# Patient Record
Sex: FEMALE | Race: WHITE | Employment: OTHER | URBAN - METROPOLITAN AREA
[De-identification: names, ages, dates, MRNs, and addresses within clinical notes are randomized per-mention and may not be internally consistent; named-entity substitution may affect disease eponyms.]

---

## 2021-04-02 DIAGNOSIS — M25.562 LEFT KNEE PAIN, UNSPECIFIED CHRONICITY: ICD-10-CM

## 2021-04-02 DIAGNOSIS — M25.561 RIGHT KNEE PAIN, UNSPECIFIED CHRONICITY: Primary | ICD-10-CM

## 2021-04-06 ENCOUNTER — OFFICE VISIT (OUTPATIENT)
Dept: ORTHOPEDIC SURGERY | Age: 66
End: 2021-04-06

## 2021-04-06 VITALS — WEIGHT: 200 LBS | BODY MASS INDEX: 36.8 KG/M2 | HEIGHT: 62 IN | TEMPERATURE: 98 F

## 2021-04-06 DIAGNOSIS — M17.11 PRIMARY OSTEOARTHRITIS OF RIGHT KNEE: Primary | ICD-10-CM

## 2021-04-06 DIAGNOSIS — M17.12 PRIMARY OSTEOARTHRITIS OF LEFT KNEE: ICD-10-CM

## 2021-04-06 PROCEDURE — 20610 DRAIN/INJ JOINT/BURSA W/O US: CPT | Performed by: ORTHOPAEDIC SURGERY

## 2021-04-06 PROCEDURE — 99204 OFFICE O/P NEW MOD 45 MIN: CPT | Performed by: ORTHOPAEDIC SURGERY

## 2021-04-06 RX ORDER — LISINOPRIL 40 MG/1
TABLET ORAL
COMMUNITY
Start: 2021-01-14

## 2021-04-06 RX ORDER — ATORVASTATIN CALCIUM 40 MG/1
TABLET, FILM COATED ORAL
COMMUNITY
Start: 2021-01-20

## 2021-04-06 RX ORDER — TRIAMCINOLONE ACETONIDE 40 MG/ML
40 INJECTION, SUSPENSION INTRA-ARTICULAR; INTRAMUSCULAR ONCE
Status: COMPLETED | OUTPATIENT
Start: 2021-04-06 | End: 2021-04-06

## 2021-04-06 RX ORDER — PIOGLITAZONEHYDROCHLORIDE 45 MG/1
TABLET ORAL
COMMUNITY
Start: 2021-01-21

## 2021-04-06 RX ORDER — DULAGLUTIDE 1.5 MG/.5ML
INJECTION, SOLUTION SUBCUTANEOUS
COMMUNITY
Start: 2021-03-15 | End: 2022-04-13

## 2021-04-06 RX ORDER — DAPAGLIFLOZIN 10 MG/1
TABLET, FILM COATED ORAL
COMMUNITY
Start: 2021-02-02

## 2021-04-06 RX ADMIN — TRIAMCINOLONE ACETONIDE 40 MG: 40 INJECTION, SUSPENSION INTRA-ARTICULAR; INTRAMUSCULAR at 14:36

## 2021-04-06 RX ADMIN — TRIAMCINOLONE ACETONIDE 40 MG: 40 INJECTION, SUSPENSION INTRA-ARTICULAR; INTRAMUSCULAR at 14:37

## 2021-04-06 NOTE — PROGRESS NOTES
Chief Complaint   Patient presents with    Knee Pain     Bilateral Knee, x years, no known injury, no previous knee surgery. Subjective:     Patient ID: Aby Bentley is a 77 y.o..  female    Knee Pain  Patient complains of bilateral knee pain L>R. This is evaluated as a personal injury. There was not a history of injury. The pain began 6 months ago. The pain is located medial. She describes  Her symptoms as aching, sharp, shooting and stabbing. She has experienced popping, clicking, locking, and giving way in the affected knee. The patient has had pain with kneeling, squating, and climbing stairs. Symptoms improve with rest, heat, ice, cortisone injection and tylenol. The symptoms are worse with activity. The knee has given out or felt unstable. The patient cannot bend and straighten the knee fully. The patient is active in none. Treatment to date has been ice, heat, Tylenol, cortisone injection, without significant relief. The patient is working. The patients occupation is retired. Past Medical History:   Diagnosis Date    Hernia     Reflux      Past Surgical History:   Procedure Laterality Date     SECTION         Current Outpatient Medications:     atorvastatin (LIPITOR) 40 MG tablet, take 1 tablet by mouth once daily, Disp: , Rfl:     FARXIGA 10 MG tablet, take 1 tablet by mouth once daily, Disp: , Rfl:     TRULICITY 1.5 NC/5.3ZB SOPN, inject 0.5 milliliters ( 1 AND 1/2 milligrams ) subcutaneously ev. ..  (REFER TO PRESCRIPTION NOTES). , Disp: , Rfl:     lisinopril (PRINIVIL;ZESTRIL) 40 MG tablet, take 1 tablet by mouth once daily, Disp: , Rfl:     pioglitazone (ACTOS) 45 MG tablet, take 1 tablet by mouth once daily, Disp: , Rfl:     metoprolol (TOPROL-XL) 25 MG XL tablet, Take 25 mg by mouth daily. , Disp: , Rfl:     esomeprazole (NEXIUM) 20 MG capsule, Take 20 mg by mouth every morning (before breakfast). , Disp: , Rfl:   No Known Allergies  Social History Socioeconomic History    Marital status:      Spouse name: Not on file    Number of children: Not on file    Years of education: Not on file    Highest education level: Not on file   Occupational History    Not on file   Social Needs    Financial resource strain: Not on file    Food insecurity     Worry: Not on file     Inability: Not on file    Transportation needs     Medical: Not on file     Non-medical: Not on file   Tobacco Use    Smoking status: Never Smoker    Smokeless tobacco: Never Used   Substance and Sexual Activity    Alcohol use: No    Drug use: No    Sexual activity: Not on file   Lifestyle    Physical activity     Days per week: Not on file     Minutes per session: Not on file    Stress: Not on file   Relationships    Social connections     Talks on phone: Not on file     Gets together: Not on file     Attends Tenriism service: Not on file     Active member of club or organization: Not on file     Attends meetings of clubs or organizations: Not on file     Relationship status: Not on file    Intimate partner violence     Fear of current or ex partner: Not on file     Emotionally abused: Not on file     Physically abused: Not on file     Forced sexual activity: Not on file   Other Topics Concern    Not on file   Social History Narrative    Not on file     No family history on file. REVIEW OF SYSTEMS:     General/Constitution:  (-)weight loss, (-)fever, (-)chills, (-)weakness. Skin: (-) rash,(-) psoriasis,(-) eczema, (-)skin cancer. Musculoskeletal: (-) fractures,  (-) dislocations,(-) collagen vascular disease, (-) fibromyalgia, (-) multiple sclerosis, (-) muscular dystrophy, (-) RSD,(-) joint pain (-)swelling, (-) joint pain,swelling. Neurologic: (-) epilepsy, (-)seizures,(-) brain tumor,(-) TIA, (-)stroke, (-)headaches, (-)Parkinson disease,(-) memory loss, (-) LOC.   Cardiovascular: (-) Chest pain, (-) swelling in legs/feet, (-) SOB, (-) cramping in legs/feet range of motion, no tenderness, palpable spasm or pain on motion. Special tests: Straight Leg Raise negative, Preston test negative. Hip exam:   Upon inspection, there is not deformity noted. Upon palpation there is not tenderness. ROM: is  full and symmetrical.   Strength: Hip Flexors 5/5; Hip Abductors 5/5; Hip Adduction 5/5. Knee exam:  Bilateral knee exam shows;  range of motion of R. Knee is 0 to 110, and L. Knee is 0 to 110. The patient does have  pain on motion, effusion is none, there is tenderness over the  medial region, there are not any masses, there is not ligamentous instability, there is varus  deformity noted. Knee exam: bilateral positive for moderate crepitations, some mild tenderness laxity is not noted with  stress. There is not a popliteal cyst.    R. Knee:  Lachman's negative, Anterior Drawer negative, Posterior Drawer negative  Marjorie's positive, Thallasy  positive,   PF grind test positive, Apprehension test negative, Patellar J sign  negative  L. Knee:  Lachman's negative, Anterior Drawer negative, Posterior Drawer negative  Marjorie's positive, Thallasy  positive,   PF grind test positive, Apprehension test negative,  Patellar J sign  negative    Xray Exam:  Moderate medial joint narrowing  Radiographic findings reviewed with patient    Assessment:  Encounter Diagnoses   Name Primary?  Primary osteoarthritis of right knee Yes    Primary osteoarthritis of left knee        Plan:  Natural history and expected course discussed. Questions answered. Educational materials distributed. Rest, ice, compression, and elevation (RICE) therapy. Reduction in offending activity. Patellar compression sleeve. I had a lengthy discussion with the patient regarding their diagnosis. I explained treatment options including surgical vs non surgical treatment. I reviewed in detail the risks and benefits and outlined the procedure in detail with expected outcomes and possible complications.

## 2021-04-08 ENCOUNTER — TELEPHONE (OUTPATIENT)
Dept: ORTHOPEDIC SURGERY | Age: 66
End: 2021-04-08

## 2021-05-05 ENCOUNTER — NURSE ONLY (OUTPATIENT)
Dept: ORTHOPEDIC SURGERY | Age: 66
End: 2021-05-05

## 2021-05-05 DIAGNOSIS — M17.11 PRIMARY OSTEOARTHRITIS OF RIGHT KNEE: Primary | ICD-10-CM

## 2021-05-05 DIAGNOSIS — M17.12 PRIMARY OSTEOARTHRITIS OF LEFT KNEE: ICD-10-CM

## 2021-05-05 PROCEDURE — 20610 DRAIN/INJ JOINT/BURSA W/O US: CPT | Performed by: NURSE PRACTITIONER

## 2021-05-05 RX ORDER — HYALURONATE SODIUM 10 MG/ML
20 SYRINGE (ML) INTRAARTICULAR ONCE
Status: COMPLETED | OUTPATIENT
Start: 2021-05-05 | End: 2021-05-05

## 2021-05-05 RX ADMIN — Medication 20 MG: at 09:17

## 2021-05-05 NOTE — PROGRESS NOTES
Chief Complaint   Patient presents with    Injections     b/l euflexxa #1       Verbal and written consent was obtained by the patient. The following is a well known to me that is here for bilateral knee injections. They are here for  Euflexxa # 1. Her knees were prepped in sterile fashion. Euflexxa 20 mg injected to Bilateral knees. The patient tolerated the injections well and I will see the patient back in 1 week for repeat injections. Ba Zimmer was seen today for injections.     Diagnoses and all orders for this visit:    Primary osteoarthritis of right knee  -     20610 - [place] DRAIN/INJECT LARGE JOINT/BURSA    Primary osteoarthritis of left knee  -     20610 - [place] DRAIN/INJECT LARGE JOINT/BURSA    Other orders  -     sodium hyaluronate (EUFLEXXA, HYALGAN) injection 20 mg  -     sodium hyaluronate (EUFLEXXA, HYALGAN) injection 20 mg

## 2021-05-12 ENCOUNTER — NURSE ONLY (OUTPATIENT)
Dept: ORTHOPEDIC SURGERY | Age: 66
End: 2021-05-12

## 2021-05-12 DIAGNOSIS — M17.11 PRIMARY OSTEOARTHRITIS OF RIGHT KNEE: Primary | ICD-10-CM

## 2021-05-12 DIAGNOSIS — M17.12 PRIMARY OSTEOARTHRITIS OF LEFT KNEE: ICD-10-CM

## 2021-05-12 PROCEDURE — 20610 DRAIN/INJ JOINT/BURSA W/O US: CPT | Performed by: NURSE PRACTITIONER

## 2021-05-12 RX ORDER — HYALURONATE SODIUM 10 MG/ML
20 SYRINGE (ML) INTRAARTICULAR ONCE
Status: COMPLETED | OUTPATIENT
Start: 2021-05-12 | End: 2021-05-12

## 2021-05-12 RX ADMIN — Medication 20 MG: at 08:58

## 2021-05-12 NOTE — PROGRESS NOTES
Chief Complaint   Patient presents with    Injections     b/l euflexxa #2       Verbal and written consent was obtained by the patient. The following is a well known to me that is here for bilateral knee injections. They are here for  Euflexxa # 2. Her knees were prepped in sterile fashion. Euflexxa 20 mg injected to Bilateral knees. The patient tolerated the injections well and I will see the patient back in 1 week for repeat injections. Roxy Kulkarni was seen today for injections.     Diagnoses and all orders for this visit:    Primary osteoarthritis of right knee  -     20610 - [place] DRAIN/INJECT LARGE JOINT/BURSA    Primary osteoarthritis of left knee  -     20610 - [place] DRAIN/INJECT LARGE JOINT/BURSA    Other orders  -     sodium hyaluronate (EUFLEXXA, HYALGAN) injection 20 mg  -     sodium hyaluronate (EUFLEXXA, HYALGAN) injection 20 mg

## 2021-05-19 ENCOUNTER — NURSE ONLY (OUTPATIENT)
Dept: ORTHOPEDIC SURGERY | Age: 66
End: 2021-05-19

## 2021-05-19 DIAGNOSIS — M17.12 PRIMARY OSTEOARTHRITIS OF LEFT KNEE: ICD-10-CM

## 2021-05-19 DIAGNOSIS — M17.11 PRIMARY OSTEOARTHRITIS OF RIGHT KNEE: Primary | ICD-10-CM

## 2021-05-19 PROCEDURE — 20610 DRAIN/INJ JOINT/BURSA W/O US: CPT | Performed by: NURSE PRACTITIONER

## 2021-05-19 RX ORDER — HYALURONATE SODIUM 10 MG/ML
20 SYRINGE (ML) INTRAARTICULAR ONCE
Status: COMPLETED | OUTPATIENT
Start: 2021-05-19 | End: 2021-05-19

## 2021-05-19 RX ADMIN — Medication 20 MG: at 09:04

## 2021-05-19 NOTE — PROGRESS NOTES
Chief Complaint   Patient presents with    Injections     b/l euflexxa #3       Verbal and written consent was obtained by the patient. The following is a well known to me that is here for Euflexxa #3. The knee was prepped in sterile fashion. Euflexxa 20 mg injected to Bilateral  knee . The patient tolerated the injections well and I will see the patient back as needed. Tay Pabon was seen today for injections.     Diagnoses and all orders for this visit:    Primary osteoarthritis of right knee  -     20610 - [place] DRAIN/INJECT LARGE JOINT/BURSA    Primary osteoarthritis of left knee  -     58629 - [place] DRAIN/INJECT LARGE JOINT/BURSA    Other orders  -     sodium hyaluronate (EUFLEXXA, HYALGAN) injection 20 mg  -     sodium hyaluronate (EUFLEXXA, HYALGAN) injection 20 mg

## 2021-06-16 ENCOUNTER — OFFICE VISIT (OUTPATIENT)
Dept: ORTHOPEDIC SURGERY | Age: 66
End: 2021-06-16

## 2021-06-16 VITALS — HEIGHT: 62 IN | WEIGHT: 200 LBS | BODY MASS INDEX: 36.8 KG/M2 | TEMPERATURE: 98 F

## 2021-06-16 DIAGNOSIS — M17.11 PRIMARY OSTEOARTHRITIS OF RIGHT KNEE: Primary | ICD-10-CM

## 2021-06-16 DIAGNOSIS — M17.12 PRIMARY OSTEOARTHRITIS OF LEFT KNEE: ICD-10-CM

## 2021-06-16 PROCEDURE — 99213 OFFICE O/P EST LOW 20 MIN: CPT | Performed by: NURSE PRACTITIONER

## 2021-06-16 RX ORDER — CELECOXIB 200 MG/1
200 CAPSULE ORAL DAILY
Qty: 30 CAPSULE | Refills: 3 | Status: SHIPPED
Start: 2021-06-16 | End: 2021-08-06

## 2021-06-16 RX ORDER — POLYETHYLENE GLYCOL 3350 17 G/17G
POWDER, FOR SOLUTION ORAL
Status: ON HOLD | COMMUNITY
Start: 2021-05-07 | End: 2021-09-24

## 2021-06-16 NOTE — PROGRESS NOTES
Chief Complaint   Patient presents with    Knee Pain     B/L knee pain follow up after Euflexxa. Knee pain reamains the same. No difference after injections. Lorraine Su returns today for follow-up of her bilateral knee pain. she reports this is worse than when I saw her last.  The patient's pain level is a 7/10. The previous treatment was not successful. Past Medical History:   Diagnosis Date    Hernia of unspecified site of abdominal cavity without mention of obstruction or gangrene     Reflux      Past Surgical History:   Procedure Laterality Date     SECTION         Current Outpatient Medications:     celecoxib (CELEBREX) 200 MG capsule, Take 1 capsule by mouth daily, Disp: 30 capsule, Rfl: 3    polyethylene glycol (GLYCOLAX) 17 GM/SCOOP powder, take as directed for COLONOSCOPY PREP, Disp: , Rfl:     atorvastatin (LIPITOR) 40 MG tablet, take 1 tablet by mouth once daily, Disp: , Rfl:     FARXIGA 10 MG tablet, take 1 tablet by mouth once daily, Disp: , Rfl:     TRULICITY 1.5 JW/4.9YR SOPN, inject 0.5 milliliters ( 1 AND 1/2 milligrams ) subcutaneously ev. ..  (REFER TO PRESCRIPTION NOTES). , Disp: , Rfl:     lisinopril (PRINIVIL;ZESTRIL) 40 MG tablet, take 1 tablet by mouth once daily, Disp: , Rfl:     pioglitazone (ACTOS) 45 MG tablet, take 1 tablet by mouth once daily, Disp: , Rfl:     metoprolol (TOPROL-XL) 25 MG XL tablet, Take 25 mg by mouth daily. , Disp: , Rfl:     esomeprazole (NEXIUM) 20 MG capsule, Take 20 mg by mouth every morning (before breakfast). , Disp: , Rfl:   No Known Allergies  Social History     Socioeconomic History    Marital status:       Spouse name: Not on file    Number of children: Not on file    Years of education: Not on file    Highest education level: Not on file   Occupational History    Not on file   Tobacco Use    Smoking status: Never Smoker    Smokeless tobacco: Never Used   Substance and Sexual Activity    Alcohol use: No    Drug use: No    Sexual activity: Not on file   Other Topics Concern    Not on file   Social History Narrative    Not on file     Social Determinants of Health     Financial Resource Strain:     Difficulty of Paying Living Expenses:    Food Insecurity:     Worried About Running Out of Food in the Last Year:     [address] in the Last Year:    Transportation Needs:     Lack of Transportation (Medical):  Lack of Transportation (Non-Medical):    Physical Activity:     Days of Exercise per Week:     Minutes of Exercise per Session:    Stress:     Feeling of Stress :    Social Connections:     Frequency of Communication with Friends and Family:     Frequency of Social Gatherings with Friends and Family:     Attends [employer] Services:     Active Member of Clubs or Organizations:     Attends Club or Organization Meetings:     Marital Status:    Intimate Partner Violence:     Fear of Current or Ex-Partner:     Emotionally Abused:     Physically Abused:     Sexually Abused:      History reviewed. No pertinent family history. Review of Systems:     Skin: (-) rash,(-) psoriasis,(-) eczema, (-)skin cancer. Musculoskeletal: (-) fractures,  (-) dislocations,(-) collagen vascular disease, (-) fibromyalgia, (-) multiple sclerosis, (-) muscular dystrophy, (-) RSD,(-) joint pain (-)swelling, (-) joint pain,swelling. Neurologic: (-) epilepsy, (-)seizures,(-) brain tumor,(-) TIA, (-)stroke, (-)headaches, (-)Parkinson disease,(-) memory loss, (-) LOC. Cardiovascular: (-) Chest pain, (-) swelling in legs/feet, (-) SOB, (-) cramping in legs/feet with walking. Constitutional:  The patient is alert and oriented x 3, appears to be stated age and in no distress. Temp 98 °F (36.7 °C)   Ht 5' 2\" (1.575 m)   Wt 200 lb (90.7 kg)   BMI 36.58 kg/m²     Skin:  Upon inspection: the skin appears warm, dry and intact. There is not a previous scar over the affected area. There is not any cellulitis, lymphedema or cutaneous lesions noted in the lower extremities. Upon palpation there is no induration noted. Neurologic:  Gait: normal;  Motor exam of the lower extremities show ; quadriceps, hamstrings, foot dorsi and plantar flexors intact R.  5/5 and L. 5/5. Deep tendon reflexes are 2/4 at the knees and 2/4 at the ankles with strong extensor hallicus longus motor strength bilaterally. Sensory to both feet is intact to all sensory roots. Cardiovascular: The vascular exam is normal and is well perfused to distal extremities. Distal pulses DP/PT: R. 2+; L. 2+. There is cap refill noted less than two seconds in all digits. There is not edema of the bilateral lower extremities. There is not varicosities noted in the distal extremities. Lymph:  Upon palpation,  there is no lymphadenopathy noted in bilateral lower extremities. Musculoskeletal:  Gait: antalgic; examination of the nails and digits reveal no cyanosis or clubbing    Lumbar exam:  On visual inspection, there is no deformity of the spine. full range of motion, no tenderness, palpable spasm or pain on motion. Special tests: Straight Leg Raise negative, Preston testnegative. Hip exam:  Upon inspection, there is no deformity noted. Upon palpation there is not tenderness. ROM: is   full and semetrical.   Strength: Hip Flexors 5/5; Hip Abductors 5/5; Hip Adduction 5/5. Knee exam:  Bilateral knee exam shows;  range of motion of R. Knee is 0 to 120, and L. Knee is 0 to 120. She does have  pain on motion, effusion is mild, there is tenderness over the  medial region, there are not any masses, there is not ligamentous instability, there is  deformity noted. Knee exam: bilateral positive for moderate crepitations, some mild tenderness laxity is not noted with  stress.       R. Knee:  Lachman's negative, Anterior Drawer negative, Posterior Drawer negative  Marjorie's positive, Thallasy  positive,   PF grind test positive, Apprehension test negative, Patellar J sign  negative  L. Knee:  Lachman's negative, Anterior Drawer negative, Posterior Drawer negative  Marjorie's positive, Thallasy  positive,   PF grind test positive, Apprehension test negative,  Patellar J sign  negative    Xrays: There is moderate compartmental narrowing medially in both knees.  There are   small medially and laterally oriented spurs bilaterally.  These are mildly   more prominent on the right.  There is no joint effusion.  There is mild   degenerative changes in the right patellofemoral articulation, superiorly. MRI:   n/a  Radiographic findings reviewed with patient    Impression:  Encounter Diagnoses   Name Primary?  Primary osteoarthritis of right knee Yes    Primary osteoarthritis of left knee        Plan:   Natural history and expected course discussed. Questions answered. Educational materials distributed. Rest, ice, compression, and elevation (RICE) therapy. Reduction in offending activity. I had a lengthy discussion with the patient regarding their diagnosis. I explained treatment options including surgical vs non surgical treatment. I reviewed in detail the risks and benefits and outlined the procedure in detail with expected outcomes and possible complications. I also discussed non surgical treatment such as injections (CSI and visco supplementation), physical therapy, topical creams and NSAID's. They have elected for conservative management at this time. The patient has failed conservative measures such as NSAIDS, HEP, and cortisone injections. She is an excellent candidate for Zilretta injections  in the Bilateral knee.  We will contact the patient's insurance company and see them back in the office once we have received approval.  celebrex

## 2021-07-02 ENCOUNTER — NURSE ONLY (OUTPATIENT)
Dept: ORTHOPEDIC SURGERY | Age: 66
End: 2021-07-02
Payer: MEDICARE

## 2021-07-02 DIAGNOSIS — M17.12 PRIMARY OSTEOARTHRITIS OF LEFT KNEE: ICD-10-CM

## 2021-07-02 DIAGNOSIS — M17.11 PRIMARY OSTEOARTHRITIS OF RIGHT KNEE: Primary | ICD-10-CM

## 2021-07-02 PROCEDURE — 20610 DRAIN/INJ JOINT/BURSA W/O US: CPT | Performed by: NURSE PRACTITIONER

## 2021-07-02 NOTE — PROGRESS NOTES
Chief Complaint   Patient presents with    Injections     b/l zilretta        I will proceed with a cortisone injection in the Bilateral knee. Verbal and written consent was obtained for the injections. The skin was prepped with alcohol. A prepared mixture of 32 mg of Zilretta and 5mL diluent was injected to Bilateral knee. The injection was given through the lateral side of the knee. The patient tolerated the injection well. I will see the patient back prn. Ishaan Naik was seen today for injections.     Diagnoses and all orders for this visit:    Primary osteoarthritis of right knee  -     20610 - [place] DRAIN/INJECT LARGE JOINT/BURSA    Primary osteoarthritis of left knee  -     20610 - [place] DRAIN/INJECT LARGE JOINT/BURSA    Other orders  -     triamcinolone acetonide (ZILRETTA) intra-articular injection 32 mg  -     triamcinolone acetonide (ZILRETTA) intra-articular injection 32 mg

## 2021-08-06 ENCOUNTER — OFFICE VISIT (OUTPATIENT)
Dept: ORTHOPEDIC SURGERY | Age: 66
End: 2021-08-06
Payer: MEDICARE

## 2021-08-06 VITALS — TEMPERATURE: 98 F | HEIGHT: 62 IN | WEIGHT: 200 LBS | BODY MASS INDEX: 36.8 KG/M2

## 2021-08-06 DIAGNOSIS — S83.242A OTHER TEAR OF MEDIAL MENISCUS OF LEFT KNEE AS CURRENT INJURY, INITIAL ENCOUNTER: ICD-10-CM

## 2021-08-06 DIAGNOSIS — S83.241A ACUTE MEDIAL MENISCUS TEAR, RIGHT, INITIAL ENCOUNTER: ICD-10-CM

## 2021-08-06 DIAGNOSIS — M17.12 PRIMARY OSTEOARTHRITIS OF LEFT KNEE: Primary | ICD-10-CM

## 2021-08-06 DIAGNOSIS — M17.11 PRIMARY OSTEOARTHRITIS OF RIGHT KNEE: ICD-10-CM

## 2021-08-06 PROCEDURE — 99213 OFFICE O/P EST LOW 20 MIN: CPT | Performed by: NURSE PRACTITIONER

## 2021-08-06 NOTE — PROGRESS NOTES
Chief Complaint   Patient presents with    Knee Pain     B/L ZILRETTA F/U says it did not help       Gama Meng returns today for follow-up of her bilateral knee pain. she reports this is worse than when I saw her last. L>R The patient's pain level is a 7/10. The previous treatment was not successful. She has failed zilretta, csi and visco.    Past Medical History:   Diagnosis Date    Hernia of unspecified site of abdominal cavity without mention of obstruction or gangrene     Reflux      Past Surgical History:   Procedure Laterality Date     SECTION         Current Outpatient Medications:     polyethylene glycol (GLYCOLAX) 17 GM/SCOOP powder, take as directed for COLONOSCOPY PREP, Disp: , Rfl:     atorvastatin (LIPITOR) 40 MG tablet, take 1 tablet by mouth once daily, Disp: , Rfl:     FARXIGA 10 MG tablet, take 1 tablet by mouth once daily, Disp: , Rfl:     TRULICITY 1.5 XB/2.9UI SOPN, inject 0.5 milliliters ( 1 AND 1/2 milligrams ) subcutaneously ev. ..  (REFER TO PRESCRIPTION NOTES). , Disp: , Rfl:     lisinopril (PRINIVIL;ZESTRIL) 40 MG tablet, take 1 tablet by mouth once daily, Disp: , Rfl:     pioglitazone (ACTOS) 45 MG tablet, take 1 tablet by mouth once daily, Disp: , Rfl:     metoprolol (TOPROL-XL) 25 MG XL tablet, Take 25 mg by mouth daily. , Disp: , Rfl:     esomeprazole (NEXIUM) 20 MG capsule, Take 20 mg by mouth every morning (before breakfast). , Disp: , Rfl:   No Known Allergies  Social History     Socioeconomic History    Marital status:       Spouse name: Not on file    Number of children: Not on file    Years of education: Not on file    Highest education level: Not on file   Occupational History    Not on file   Tobacco Use    Smoking status: Never Smoker    Smokeless tobacco: Never Used   Substance and Sexual Activity    Alcohol use: No    Drug use: No    Sexual activity: Not on file   Other Topics Concern    Not on file   Social History Narrative    Not on file     Social Determinants of Health     Financial Resource Strain:     Difficulty of Paying Living Expenses:    Food Insecurity:     Worried About Running Out of Food in the Last Year:     [address] in the Last Year:    Transportation Needs:     Lack of Transportation (Medical):  Lack of Transportation (Non-Medical):    Physical Activity:     Days of Exercise per Week:     Minutes of Exercise per Session:    Stress:     Feeling of Stress :    Social Connections:     Frequency of Communication with Friends and Family:     Frequency of Social Gatherings with Friends and Family:     Attends [employer] Services:     Active Member of Clubs or Organizations:     Attends Club or Organization Meetings:     Marital Status:    Intimate Partner Violence:     Fear of Current or Ex-Partner:     Emotionally Abused:     Physically Abused:     Sexually Abused:      No family history on file. Review of Systems:     Skin: (-) rash,(-) psoriasis,(-) eczema, (-)skin cancer. Musculoskeletal: (-) fractures,  (-) dislocations,(-) collagen vascular disease, (-) fibromyalgia, (-) multiple sclerosis, (-) muscular dystrophy, (-) RSD,(-) joint pain (-)swelling, (-) joint pain,swelling. Neurologic: (-) epilepsy, (-)seizures,(-) brain tumor,(-) TIA, (-)stroke, (-)headaches, (-)Parkinson disease,(-) memory loss, (-) LOC. Cardiovascular: (-) Chest pain, (-) swelling in legs/feet, (-) SOB, (-) cramping in legs/feet with walking. Constitutional:  The patient is alert and oriented x 3, appears to be stated age and in no distress. Temp 98 °F (36.7 °C)   Ht 5' 2\" (1.575 m)   Wt 200 lb (90.7 kg)   BMI 36.58 kg/m²     Skin:  Upon inspection: the skin appears warm, dry and intact. There is not a previous scar over the affected area. There is not any cellulitis, lymphedema or cutaneous lesions noted in the lower extremities. Upon palpation there is no induration noted.       Neurologic:  Gait: normal;  Motor exam of the lower extremities show ; quadriceps, hamstrings, foot dorsi and plantar flexors intact R.  5/5 and L. 5/5. Deep tendon reflexes are 2/4 at the knees and 2/4 at the ankles with strong extensor hallicus longus motor strength bilaterally. Sensory to both feet is intact to all sensory roots. Cardiovascular: The vascular exam is normal and is well perfused to distal extremities. Distal pulses DP/PT: R. 2+; L. 2+. There is cap refill noted less than two seconds in all digits. There is not edema of the bilateral lower extremities. There is not varicosities noted in the distal extremities. Lymph:  Upon palpation,  there is no lymphadenopathy noted in bilateral lower extremities. Musculoskeletal:  Gait: antalgic; examination of the nails and digits reveal no cyanosis or clubbing    Lumbar exam:  On visual inspection, there is no deformity of the spine. full range of motion, no tenderness, palpable spasm or pain on motion. Special tests: Straight Leg Raise negative, Preston testnegative. Hip exam:  Upon inspection, there is no deformity noted. Upon palpation there is not tenderness. ROM: is   full and semetrical.   Strength: Hip Flexors 5/5; Hip Abductors 5/5; Hip Adduction 5/5. Knee exam:  Bilateral knee exam shows;  range of motion of R. Knee is 0 to 120, and L. Knee is 0 to 120. She does have  pain on motion, effusion is mild, there is tenderness over the  medial region, there are not any masses, there is not ligamentous instability, there is not  deformity noted. Knee exam: bilateral positive for moderate crepitations, some mild tenderness laxity is not noted with  stress.       R. Knee:  Lachman's negative, Anterior Drawer negative, Posterior Drawer negative  Marjorie's positive, Thallasy  positive,   PF grind test positive, Apprehension test negative, Patellar J sign  negative  L. Knee:  Lachman's negative, Anterior Drawer negative, Posterior Drawer negative  Marjorie's positive, Thallasy  positive,   PF grind test positive, Apprehension test negative,  Patellar J sign  negative    Xrays: There is moderate compartmental narrowing medially in both knees.  There are   small medially and laterally oriented spurs bilaterally.  These are mildly   more prominent on the right.  There is no joint effusion.  There is mild   degenerative changes in the right patellofemoral articulation, superiorly.             MRI:   n/a  Radiographic findings reviewed with patient    Impression:  Encounter Diagnoses   Name Primary?  Primary osteoarthritis of left knee Yes    Other tear of medial meniscus of left knee as current injury, initial encounter        Plan:   Natural history and expected course discussed. Questions answered. Educational materials distributed. Rest, ice, compression, and elevation (RICE) therapy. Reduction in offending activity. I had a lengthy discussion with the patient regarding their diagnosis. I explained treatment options including surgical vs non surgical treatment. I reviewed in detail the risks and benefits and outlined the procedure in detail with expected outcomes and possible complications. I also discussed non surgical treatment such as injections (CSI and visco supplementation), physical therapy, topical creams and NSAID's. They have elected for conservative management at this time.    SORAYA left knee

## 2021-08-31 ENCOUNTER — OFFICE VISIT (OUTPATIENT)
Dept: ORTHOPEDIC SURGERY | Age: 66
End: 2021-08-31
Payer: MEDICARE

## 2021-08-31 VITALS — WEIGHT: 200 LBS | BODY MASS INDEX: 36.8 KG/M2 | TEMPERATURE: 98 F | HEIGHT: 62 IN

## 2021-08-31 DIAGNOSIS — S83.242A ACUTE MEDIAL MENISCUS TEAR, LEFT, INITIAL ENCOUNTER: ICD-10-CM

## 2021-08-31 DIAGNOSIS — M17.12 PRIMARY OSTEOARTHRITIS OF LEFT KNEE: Primary | ICD-10-CM

## 2021-08-31 PROCEDURE — 3017F COLORECTAL CA SCREEN DOC REV: CPT | Performed by: ORTHOPAEDIC SURGERY

## 2021-08-31 PROCEDURE — G8427 DOCREV CUR MEDS BY ELIG CLIN: HCPCS | Performed by: ORTHOPAEDIC SURGERY

## 2021-08-31 PROCEDURE — 1123F ACP DISCUSS/DSCN MKR DOCD: CPT | Performed by: ORTHOPAEDIC SURGERY

## 2021-08-31 PROCEDURE — G8400 PT W/DXA NO RESULTS DOC: HCPCS | Performed by: ORTHOPAEDIC SURGERY

## 2021-08-31 PROCEDURE — 1090F PRES/ABSN URINE INCON ASSESS: CPT | Performed by: ORTHOPAEDIC SURGERY

## 2021-08-31 PROCEDURE — 99214 OFFICE O/P EST MOD 30 MIN: CPT | Performed by: ORTHOPAEDIC SURGERY

## 2021-08-31 PROCEDURE — G8417 CALC BMI ABV UP PARAM F/U: HCPCS | Performed by: ORTHOPAEDIC SURGERY

## 2021-08-31 PROCEDURE — 4040F PNEUMOC VAC/ADMIN/RCVD: CPT | Performed by: ORTHOPAEDIC SURGERY

## 2021-08-31 PROCEDURE — 1036F TOBACCO NON-USER: CPT | Performed by: ORTHOPAEDIC SURGERY

## 2021-08-31 NOTE — PROGRESS NOTES
Chief Complaint   Patient presents with    Knee Pain     B/L knees MRI follow up       Munira Shoemaker returns today for follow-up of her bilateral knee pain. she reports this is worse than when I saw her last. L>R The patient's pain level is a 7/10. The previous treatment was not successful. She has failed zilretta, csi and visco.she is here today for mri results. Past Medical History:   Diagnosis Date    Hernia of unspecified site of abdominal cavity without mention of obstruction or gangrene     Reflux      Past Surgical History:   Procedure Laterality Date     SECTION         Current Outpatient Medications:     polyethylene glycol (GLYCOLAX) 17 GM/SCOOP powder, take as directed for COLONOSCOPY PREP, Disp: , Rfl:     atorvastatin (LIPITOR) 40 MG tablet, take 1 tablet by mouth once daily, Disp: , Rfl:     FARXIGA 10 MG tablet, take 1 tablet by mouth once daily, Disp: , Rfl:     TRULICITY 1.5 WH/4.6EF SOPN, inject 0.5 milliliters ( 1 AND 1/2 milligrams ) subcutaneously ev. ..  (REFER TO PRESCRIPTION NOTES). , Disp: , Rfl:     lisinopril (PRINIVIL;ZESTRIL) 40 MG tablet, take 1 tablet by mouth once daily, Disp: , Rfl:     pioglitazone (ACTOS) 45 MG tablet, take 1 tablet by mouth once daily, Disp: , Rfl:     metoprolol (TOPROL-XL) 25 MG XL tablet, Take 25 mg by mouth daily. , Disp: , Rfl:     esomeprazole (NEXIUM) 20 MG capsule, Take 20 mg by mouth every morning (before breakfast). , Disp: , Rfl:   No Known Allergies  Social History     Socioeconomic History    Marital status:       Spouse name: Not on file    Number of children: Not on file    Years of education: Not on file    Highest education level: Not on file   Occupational History    Not on file   Tobacco Use    Smoking status: Never Smoker    Smokeless tobacco: Never Used   Substance and Sexual Activity    Alcohol use: No    Drug use: No    Sexual activity: Not on file   Other Topics Concern    Not on file   Social History Narrative    Not on file     Social Determinants of Health     Financial Resource Strain:     Difficulty of Paying Living Expenses:    Food Insecurity:     Worried About Running Out of Food in the Last Year:     [address] in the Last Year:    Transportation Needs:     Lack of Transportation (Medical):  Lack of Transportation (Non-Medical):    Physical Activity:     Days of Exercise per Week:     Minutes of Exercise per Session:    Stress:     Feeling of Stress :    Social Connections:     Frequency of Communication with Friends and Family:     Frequency of Social Gatherings with Friends and Family:     Attends Alevism Services:     Active Member of Clubs or Organizations:     Attends Club or Organization Meetings:     Marital Status:    Intimate Partner Violence:     Fear of Current or Ex-Partner:     Emotionally Abused:     Physically Abused:     Sexually Abused:      History reviewed. No pertinent family history. Review of Systems:     Skin: (-) rash,(-) psoriasis,(-) eczema, (-)skin cancer. Musculoskeletal: (-) fractures,  (-) dislocations,(-) collagen vascular disease, (-) fibromyalgia, (-) multiple sclerosis, (-) muscular dystrophy, (-) RSD,(-) joint pain (-)swelling, (-) joint pain,swelling. Neurologic: (-) epilepsy, (-)seizures,(-) brain tumor,(-) TIA, (-)stroke, (-)headaches, (-)Parkinson disease,(-) memory loss, (-) LOC. Cardiovascular: (-) Chest pain, (-) swelling in legs/feet, (-) SOB, (-) cramping in legs/feet with walking. Constitutional:  _Temp 98 °F (36.7 °C)   Ht 5' 2\" (1.575 m)   Wt 200 lb (90.7 kg)   BMI 36.58 kg/m²  Vital signs are stable. In general, patient is awake, alert and oriented X3, in no apparent distress. Examination of HENT reveals normocephalic, atraumatic. PERRLA/EOMI sclera are white. Conjunctivae are clear. TM's are intact. Pharynx is pink and moist.  Uvula and tongue are midline.   Heart: Positive S1 and positive S2 with motion of R. Knee is 0 to 120, and L. Knee is 0 to 120. She does have  pain on motion, effusion is mild, there is tenderness over the  medial region, there are not any masses, there is not ligamentous instability, there is not  deformity noted. Knee exam: bilateral positive for moderate crepitations, some mild tenderness laxity is not noted with  stress. R. Knee:  Lachman's negative, Anterior Drawer negative, Posterior Drawer negative  Marjorie's positive, Thallasy  positive,   PF grind test positive, Apprehension test negative, Patellar J sign  negative  L. Knee:  Lachman's negative, Anterior Drawer negative, Posterior Drawer negative  Marjorie's positive, Thallasy  positive,   PF grind test positive, Apprehension test negative,  Patellar J sign  negative    Xrays: There is moderate compartmental narrowing medially in both knees.  There are   small medially and laterally oriented spurs bilaterally.  These are mildly   more prominent on the right.  There is no joint effusion.  There is mild   degenerative changes in the right patellofemoral articulation, superiorly.             MRI:   Left knee:  Medial meniscus tears and degeneration.       Tricompartmental degenerative changes to the knee most significant to the   medial compartment.       Small knee joint effusion.       Small Baker's cyst with loose body within the cyst measuring 11 mm.       Mild prominence and edema to the quadriceps fat pad, nonspecific but can be   seen with impingement. Right knee:  Tearing and degeneration of the medial meniscus.       Tricompartmental degenerative changes most significant to the medial   compartment.       Small Baker's cyst.       Mild prominence and edema to quadriceps fat pad, nonspecific but can be seen   with impingement. Radiographic findings reviewed with patient    Impression:  Encounter Diagnoses   Name Primary?     Primary osteoarthritis of left knee Yes    Acute medial meniscus tear, left,

## 2021-09-15 ENCOUNTER — ANESTHESIA EVENT (OUTPATIENT)
Dept: OPERATING ROOM | Age: 66
End: 2021-09-15
Payer: MEDICARE

## 2021-09-23 ASSESSMENT — LIFESTYLE VARIABLES: SMOKING_STATUS: 1

## 2021-09-23 NOTE — ANESTHESIA PRE PROCEDURE
Department of Anesthesiology  Preprocedure Note       Name:  Tian Cordova   Age:  77 y.o.  :  1955                                          MRN:  [id number]         Date:  2021      Surgeon: Lionel Easley): Carole Tamez DO    Procedure: Procedure(s):  LEFT KNEE ARTHROSCOPY, MEDIAL MENISECTOMY AND DEBRIDEMENT    Medications prior to admission:   Prior to Admission medications    Medication Sig Start Date End Date Taking? Authorizing Provider   atorvastatin (LIPITOR) 40 MG tablet take 1 tablet by mouth once daily 21  Yes Historical Provider, MD   FARXIGA 10 MG tablet take 1 tablet by mouth once daily 21  Yes Historical Provider, MD   TRULICITY 1.5 SG/3.4RZ SOPN renetta 3/15/21  Yes Historical Provider, MD   lisinopril (PRINIVIL;ZESTRIL) 40 MG tablet am 21  Yes Historical Provider, MD   pioglitazone (ACTOS) 45 MG tablet take 1 tablet by mouth once daily 21  Yes Historical Provider, MD   metoprolol (TOPROL-XL) 25 MG XL tablet Take 75 mg by mouth daily am   Yes Historical Provider, MD   polyethylene glycol (GLYCOLAX) 17 GM/SCOOP powder take as directed for COLONOSCOPY PREP 21   Historical Provider, MD       Current medications:    No current facility-administered medications for this encounter.      Current Outpatient Medications   Medication Sig Dispense Refill    atorvastatin (LIPITOR) 40 MG tablet take 1 tablet by mouth once daily      FARXIGA 10 MG tablet take 1 tablet by mouth once daily      TRULICITY 1.5 NO/0.6PL SOPN       lisinopril (PRINIVIL;ZESTRIL) 40 MG tablet am      pioglitazone (ACTOS) 45 MG tablet take 1 tablet by mouth once daily      metoprolol (TOPROL-XL) 25 MG XL tablet Take 75 mg by mouth daily am      polyethylene glycol (GLYCOLAX) 17 GM/SCOOP powder take as directed for COLONOSCOPY PREP         Allergies:  No Known Allergies    Problem List:    Patient Active Problem List   Diagnosis Code    Knee pain M25.569    Right knee DJD M17.11    Primary input(s): POCGLU, POCNA, POCK, POCCL, POCBUN, POCHEMO, POCHCT in the last 72 hours. Coags: No results found for: PROTIME, INR, APTT    HCG (If Applicable): No results found for: PREGTESTUR, PREGSERUM, HCG, HCGQUANT     ABGs: No results found for: PHART, PO2ART, TBL1OJX, BTS5XYX, BEART, P3RFLWDD     Type & Screen (If Applicable):  No results found for: LABABO, LABRH    Drug/Infectious Status (If Applicable):  No results found for: HIV, HEPCAB    COVID-19 Screening (If Applicable): No results found for: COVID19        Anesthesia Evaluation  Patient summary reviewed and Nursing notes reviewed no history of anesthetic complications:   Airway: Mallampati: III  TM distance: >3 FB   Neck ROM: full  Mouth opening: > = 3 FB Dental: normal exam         Pulmonary: breath sounds clear to auscultation  (+) current smoker (10 pk yrs)          Patient did not smoke on day of surgery. Cardiovascular:  Exercise tolerance: good (>4 METS),   (+) hypertension:, hyperlipidemia      ECG reviewed  Rhythm: regular             Beta Blocker:  Dose within 24 Hrs      ROS comment: EKG=SR   Normal EKG    Cleared by PCP    PE comment: tachycardia   Neuro/Psych:               GI/Hepatic/Renal:             Endo/Other:    (+) DiabetesType II DM, , : arthritis:., .                 Abdominal:   (+) obese,           Vascular: Other Findings:           Anesthesia Plan      general     ASA 2     (Possible SAB--prefers GA over SAB  PCP clearance on chart)  Induction: intravenous. BIS  MIPS: Postoperative opioids intended and Prophylactic antiemetics administered. Anesthetic plan and risks discussed with patient. Plan discussed with CRNA.                 Kai Garcia MD   9/23/2021

## 2021-09-24 ENCOUNTER — HOSPITAL ENCOUNTER (OUTPATIENT)
Age: 66
Setting detail: OUTPATIENT SURGERY
Discharge: HOME | End: 2021-09-24
Attending: ORTHOPAEDIC SURGERY | Admitting: ORTHOPAEDIC SURGERY
Payer: MEDICARE

## 2021-09-24 ENCOUNTER — ANESTHESIA (OUTPATIENT)
Dept: OPERATING ROOM | Age: 66
End: 2021-09-24
Payer: MEDICARE

## 2021-09-24 VITALS
SYSTOLIC BLOOD PRESSURE: 86 MMHG | DIASTOLIC BLOOD PRESSURE: 52 MMHG | OXYGEN SATURATION: 94 % | RESPIRATION RATE: 11 BRPM

## 2021-09-24 VITALS
DIASTOLIC BLOOD PRESSURE: 61 MMHG | BODY MASS INDEX: 33.9 KG/M2 | WEIGHT: 184.2 LBS | TEMPERATURE: 97 F | HEART RATE: 79 BPM | RESPIRATION RATE: 16 BRPM | OXYGEN SATURATION: 96 % | HEIGHT: 62 IN | SYSTOLIC BLOOD PRESSURE: 115 MMHG

## 2021-09-24 DIAGNOSIS — M17.12 PRIMARY OSTEOARTHRITIS OF LEFT KNEE: Primary | ICD-10-CM

## 2021-09-24 LAB — METER GLUCOSE: 139 MG/DL (ref 74–99)

## 2021-09-24 PROCEDURE — 7100000001 HC PACU RECOVERY - ADDTL 15 MIN: Performed by: ORTHOPAEDIC SURGERY

## 2021-09-24 PROCEDURE — 82962 GLUCOSE BLOOD TEST: CPT | Performed by: ORTHOPAEDIC SURGERY

## 2021-09-24 PROCEDURE — 6370000000 HC RX 637 (ALT 250 FOR IP): Performed by: ANESTHESIOLOGY

## 2021-09-24 PROCEDURE — 6360000002 HC RX W HCPCS: Performed by: ORTHOPAEDIC SURGERY

## 2021-09-24 PROCEDURE — 7100000011 HC PHASE II RECOVERY - ADDTL 15 MIN: Performed by: ORTHOPAEDIC SURGERY

## 2021-09-24 PROCEDURE — 2580000003 HC RX 258

## 2021-09-24 PROCEDURE — 2720000010 HC SURG SUPPLY STERILE: Performed by: ORTHOPAEDIC SURGERY

## 2021-09-24 PROCEDURE — 3700000001 HC ADD 15 MINUTES (ANESTHESIA): Performed by: ORTHOPAEDIC SURGERY

## 2021-09-24 PROCEDURE — 3700000000 HC ANESTHESIA ATTENDED CARE: Performed by: ORTHOPAEDIC SURGERY

## 2021-09-24 PROCEDURE — 2580000003 HC RX 258: Performed by: ANESTHESIOLOGY

## 2021-09-24 PROCEDURE — 82962 GLUCOSE BLOOD TEST: CPT

## 2021-09-24 PROCEDURE — 29876 ARTHRS KNEE SURG SYNVCT MAJ: CPT | Performed by: ORTHOPAEDIC SURGERY

## 2021-09-24 PROCEDURE — 2500000003 HC RX 250 WO HCPCS

## 2021-09-24 PROCEDURE — 3600000003 HC SURGERY LEVEL 3 BASE: Performed by: ORTHOPAEDIC SURGERY

## 2021-09-24 PROCEDURE — 3600000013 HC SURGERY LEVEL 3 ADDTL 15MIN: Performed by: ORTHOPAEDIC SURGERY

## 2021-09-24 PROCEDURE — 7100000010 HC PHASE II RECOVERY - FIRST 15 MIN: Performed by: ORTHOPAEDIC SURGERY

## 2021-09-24 PROCEDURE — 2500000003 HC RX 250 WO HCPCS: Performed by: ORTHOPAEDIC SURGERY

## 2021-09-24 PROCEDURE — 7100000000 HC PACU RECOVERY - FIRST 15 MIN: Performed by: ORTHOPAEDIC SURGERY

## 2021-09-24 PROCEDURE — 2709999900 HC NON-CHARGEABLE SUPPLY: Performed by: ORTHOPAEDIC SURGERY

## 2021-09-24 PROCEDURE — 6360000002 HC RX W HCPCS

## 2021-09-24 RX ORDER — FENTANYL CITRATE 50 UG/ML
50 INJECTION, SOLUTION INTRAMUSCULAR; INTRAVENOUS EVERY 5 MIN PRN
Status: DISCONTINUED | OUTPATIENT
Start: 2021-09-24 | End: 2021-09-24 | Stop reason: HOSPADM

## 2021-09-24 RX ORDER — DEXAMETHASONE SODIUM PHOSPHATE 10 MG/ML
INJECTION, SOLUTION INTRAMUSCULAR; INTRAVENOUS PRN
Status: DISCONTINUED | OUTPATIENT
Start: 2021-09-24 | End: 2021-09-24 | Stop reason: SDUPTHER

## 2021-09-24 RX ORDER — LIDOCAINE HYDROCHLORIDE 20 MG/ML
INJECTION, SOLUTION INTRAVENOUS PRN
Status: DISCONTINUED | OUTPATIENT
Start: 2021-09-24 | End: 2021-09-24 | Stop reason: SDUPTHER

## 2021-09-24 RX ORDER — MORPHINE SULFATE 2 MG/ML
1 INJECTION, SOLUTION INTRAMUSCULAR; INTRAVENOUS EVERY 5 MIN PRN
Status: DISCONTINUED | OUTPATIENT
Start: 2021-09-24 | End: 2021-09-24 | Stop reason: HOSPADM

## 2021-09-24 RX ORDER — FENTANYL CITRATE 50 UG/ML
INJECTION, SOLUTION INTRAMUSCULAR; INTRAVENOUS PRN
Status: DISCONTINUED | OUTPATIENT
Start: 2021-09-24 | End: 2021-09-24 | Stop reason: SDUPTHER

## 2021-09-24 RX ORDER — DIPHENHYDRAMINE HYDROCHLORIDE 50 MG/ML
12.5 INJECTION INTRAMUSCULAR; INTRAVENOUS
Status: DISCONTINUED | OUTPATIENT
Start: 2021-09-24 | End: 2021-09-24 | Stop reason: HOSPADM

## 2021-09-24 RX ORDER — LABETALOL HYDROCHLORIDE 5 MG/ML
5 INJECTION, SOLUTION INTRAVENOUS EVERY 10 MIN PRN
Status: DISCONTINUED | OUTPATIENT
Start: 2021-09-24 | End: 2021-09-24 | Stop reason: HOSPADM

## 2021-09-24 RX ORDER — BUPIVACAINE HYDROCHLORIDE 2.5 MG/ML
INJECTION, SOLUTION EPIDURAL; INFILTRATION; INTRACAUDAL PRN
Status: DISCONTINUED | OUTPATIENT
Start: 2021-09-24 | End: 2021-09-24 | Stop reason: ALTCHOICE

## 2021-09-24 RX ORDER — HYDRALAZINE HYDROCHLORIDE 20 MG/ML
5 INJECTION INTRAMUSCULAR; INTRAVENOUS EVERY 10 MIN PRN
Status: DISCONTINUED | OUTPATIENT
Start: 2021-09-24 | End: 2021-09-24 | Stop reason: HOSPADM

## 2021-09-24 RX ORDER — FAMOTIDINE 20 MG/1
20 TABLET, FILM COATED ORAL ONCE
Status: COMPLETED | OUTPATIENT
Start: 2021-09-24 | End: 2021-09-24

## 2021-09-24 RX ORDER — DIPHENHYDRAMINE HYDROCHLORIDE 50 MG/ML
INJECTION INTRAMUSCULAR; INTRAVENOUS PRN
Status: DISCONTINUED | OUTPATIENT
Start: 2021-09-24 | End: 2021-09-24 | Stop reason: SDUPTHER

## 2021-09-24 RX ORDER — SODIUM CHLORIDE, SODIUM LACTATE, POTASSIUM CHLORIDE, CALCIUM CHLORIDE 600; 310; 30; 20 MG/100ML; MG/100ML; MG/100ML; MG/100ML
INJECTION, SOLUTION INTRAVENOUS CONTINUOUS PRN
Status: DISCONTINUED | OUTPATIENT
Start: 2021-09-24 | End: 2021-09-24 | Stop reason: SDUPTHER

## 2021-09-24 RX ORDER — GLYCOPYRROLATE 1 MG/5 ML
SYRINGE (ML) INTRAVENOUS PRN
Status: DISCONTINUED | OUTPATIENT
Start: 2021-09-24 | End: 2021-09-24 | Stop reason: SDUPTHER

## 2021-09-24 RX ORDER — PROPOFOL 10 MG/ML
INJECTION, EMULSION INTRAVENOUS PRN
Status: DISCONTINUED | OUTPATIENT
Start: 2021-09-24 | End: 2021-09-24 | Stop reason: SDUPTHER

## 2021-09-24 RX ORDER — OXYCODONE HYDROCHLORIDE AND ACETAMINOPHEN 5; 325 MG/1; MG/1
1 TABLET ORAL EVERY 4 HOURS PRN
Status: DISCONTINUED | OUTPATIENT
Start: 2021-09-24 | End: 2021-09-24 | Stop reason: HOSPADM

## 2021-09-24 RX ORDER — METOCLOPRAMIDE 10 MG/1
10 TABLET ORAL ONCE
Status: COMPLETED | OUTPATIENT
Start: 2021-09-24 | End: 2021-09-24

## 2021-09-24 RX ORDER — HYDROCODONE BITARTRATE AND ACETAMINOPHEN 5; 325 MG/1; MG/1
2 TABLET ORAL PRN
Status: COMPLETED | OUTPATIENT
Start: 2021-09-24 | End: 2021-09-24

## 2021-09-24 RX ORDER — MIDAZOLAM HYDROCHLORIDE 1 MG/ML
INJECTION INTRAMUSCULAR; INTRAVENOUS PRN
Status: DISCONTINUED | OUTPATIENT
Start: 2021-09-24 | End: 2021-09-24 | Stop reason: SDUPTHER

## 2021-09-24 RX ORDER — CEFAZOLIN SODIUM 2 G/50ML
2000 SOLUTION INTRAVENOUS ONCE
Status: COMPLETED | OUTPATIENT
Start: 2021-09-24 | End: 2021-09-24

## 2021-09-24 RX ORDER — HYDROCODONE BITARTRATE AND ACETAMINOPHEN 5; 325 MG/1; MG/1
1 TABLET ORAL PRN
Status: COMPLETED | OUTPATIENT
Start: 2021-09-24 | End: 2021-09-24

## 2021-09-24 RX ORDER — PROMETHAZINE HYDROCHLORIDE 25 MG/ML
25 INJECTION, SOLUTION INTRAMUSCULAR; INTRAVENOUS
Status: DISCONTINUED | OUTPATIENT
Start: 2021-09-24 | End: 2021-09-24 | Stop reason: HOSPADM

## 2021-09-24 RX ORDER — ONDANSETRON 2 MG/ML
INJECTION INTRAMUSCULAR; INTRAVENOUS PRN
Status: DISCONTINUED | OUTPATIENT
Start: 2021-09-24 | End: 2021-09-24 | Stop reason: SDUPTHER

## 2021-09-24 RX ORDER — SODIUM CHLORIDE, SODIUM LACTATE, POTASSIUM CHLORIDE, CALCIUM CHLORIDE 600; 310; 30; 20 MG/100ML; MG/100ML; MG/100ML; MG/100ML
INJECTION, SOLUTION INTRAVENOUS CONTINUOUS
Status: DISCONTINUED | OUTPATIENT
Start: 2021-09-24 | End: 2021-09-24 | Stop reason: HOSPADM

## 2021-09-24 RX ORDER — MEPERIDINE HYDROCHLORIDE 25 MG/ML
12.5 INJECTION INTRAMUSCULAR; INTRAVENOUS; SUBCUTANEOUS EVERY 5 MIN PRN
Status: DISCONTINUED | OUTPATIENT
Start: 2021-09-24 | End: 2021-09-24 | Stop reason: HOSPADM

## 2021-09-24 RX ORDER — KETOROLAC TROMETHAMINE 30 MG/ML
INJECTION, SOLUTION INTRAMUSCULAR; INTRAVENOUS PRN
Status: DISCONTINUED | OUTPATIENT
Start: 2021-09-24 | End: 2021-09-24 | Stop reason: SDUPTHER

## 2021-09-24 RX ORDER — HYDROCODONE BITARTRATE AND ACETAMINOPHEN 5; 325 MG/1; MG/1
1 TABLET ORAL EVERY 6 HOURS PRN
Qty: 28 TABLET | Refills: 0 | Status: SHIPPED | OUTPATIENT
Start: 2021-09-24 | End: 2021-10-01

## 2021-09-24 RX ADMIN — SODIUM CHLORIDE, POTASSIUM CHLORIDE, SODIUM LACTATE AND CALCIUM CHLORIDE: 600; 310; 30; 20 INJECTION, SOLUTION INTRAVENOUS at 10:40

## 2021-09-24 RX ADMIN — FENTANYL CITRATE 50 MCG: 50 INJECTION, SOLUTION INTRAMUSCULAR; INTRAVENOUS at 11:13

## 2021-09-24 RX ADMIN — DEXAMETHASONE SODIUM PHOSPHATE 10 MG: 10 INJECTION, SOLUTION INTRAMUSCULAR; INTRAVENOUS at 11:17

## 2021-09-24 RX ADMIN — PHENYLEPHRINE HYDROCHLORIDE 100 MCG: 10 INJECTION INTRAVENOUS at 11:33

## 2021-09-24 RX ADMIN — FAMOTIDINE 20 MG: 20 TABLET ORAL at 10:15

## 2021-09-24 RX ADMIN — FENTANYL CITRATE 50 MCG: 50 INJECTION, SOLUTION INTRAMUSCULAR; INTRAVENOUS at 11:32

## 2021-09-24 RX ADMIN — METOCLOPRAMIDE 10 MG: 10 TABLET ORAL at 10:15

## 2021-09-24 RX ADMIN — PHENYLEPHRINE HYDROCHLORIDE 200 MCG: 10 INJECTION INTRAVENOUS at 11:39

## 2021-09-24 RX ADMIN — KETOROLAC TROMETHAMINE 30 MG: 30 INJECTION, SOLUTION INTRAMUSCULAR; INTRAVENOUS at 11:48

## 2021-09-24 RX ADMIN — MIDAZOLAM 2 MG: 1 INJECTION INTRAMUSCULAR; INTRAVENOUS at 11:09

## 2021-09-24 RX ADMIN — DIPHENHYDRAMINE HYDROCHLORIDE 12.5 MG: 50 INJECTION, SOLUTION INTRAMUSCULAR; INTRAVENOUS at 11:43

## 2021-09-24 RX ADMIN — ONDANSETRON 4 MG: 2 INJECTION INTRAMUSCULAR; INTRAVENOUS at 11:48

## 2021-09-24 RX ADMIN — PROPOFOL 150 MG: 10 INJECTION, EMULSION INTRAVENOUS at 11:13

## 2021-09-24 RX ADMIN — CEFAZOLIN SODIUM 2000 MG: 2 SOLUTION INTRAVENOUS at 11:10

## 2021-09-24 RX ADMIN — HYDROCODONE BITARTRATE AND ACETAMINOPHEN 1 TABLET: 5; 325 TABLET ORAL at 12:40

## 2021-09-24 RX ADMIN — Medication 0.1 MG: at 11:13

## 2021-09-24 RX ADMIN — PHENYLEPHRINE HYDROCHLORIDE 100 MCG: 10 INJECTION INTRAVENOUS at 11:30

## 2021-09-24 RX ADMIN — SODIUM CHLORIDE, POTASSIUM CHLORIDE, SODIUM LACTATE AND CALCIUM CHLORIDE: 600; 310; 30; 20 INJECTION, SOLUTION INTRAVENOUS at 11:09

## 2021-09-24 RX ADMIN — LIDOCAINE HYDROCHLORIDE 40 MG: 20 INJECTION, SOLUTION INTRAVENOUS at 11:13

## 2021-09-24 ASSESSMENT — PULMONARY FUNCTION TESTS
PIF_VALUE: 13
PIF_VALUE: 13
PIF_VALUE: 0
PIF_VALUE: 13
PIF_VALUE: 2
PIF_VALUE: 17
PIF_VALUE: 13
PIF_VALUE: 5
PIF_VALUE: 13
PIF_VALUE: 3
PIF_VALUE: 13
PIF_VALUE: 2
PIF_VALUE: 17
PIF_VALUE: 10
PIF_VALUE: 0
PIF_VALUE: 13
PIF_VALUE: 0
PIF_VALUE: 6
PIF_VALUE: 13

## 2021-09-24 ASSESSMENT — PAIN DESCRIPTION - LOCATION: LOCATION: KNEE

## 2021-09-24 ASSESSMENT — PAIN DESCRIPTION - PAIN TYPE: TYPE: SURGICAL PAIN

## 2021-09-24 ASSESSMENT — PAIN SCALES - GENERAL
PAINLEVEL_OUTOF10: 0
PAINLEVEL_OUTOF10: 0
PAINLEVEL_OUTOF10: 2
PAINLEVEL_OUTOF10: 2
PAINLEVEL_OUTOF10: 0
PAINLEVEL_OUTOF10: 0

## 2021-09-24 ASSESSMENT — PAIN DESCRIPTION - ORIENTATION: ORIENTATION: LEFT

## 2021-09-24 ASSESSMENT — PAIN - FUNCTIONAL ASSESSMENT: PAIN_FUNCTIONAL_ASSESSMENT: 0-10

## 2021-09-24 NOTE — OP NOTE
had grade III-Iv defects measuring 3x2cm. The medial tibial plateau had grade III-Iv defects measuring 2x2cm. There was not tear involving the body and posterior horn of the medial meniscus which was unstable to probing. I then advanced the scope into the lateral compartment compartment. The lateral femoral condyle had stable defects measuring 0. The lateral tibial plateau had stable defects measuring 0. There was not tear involving the body and posterior horn of the lateral meniscus which was unstable to probing. I then established a medial working portal and carried out a tricompartmental   synovectomy removing angry synovium from the suprapatellar pouch, medial and   lateral compartments. The suprapatellar/medial and infrapatellar plica were removed using the 4-0 shaver. The articular surface defects involving the mfc/patella  were performed using the 4.0 Aggressive Plus shaver in a forward manner, smoothed all unstable articular cartilage. The incisions were then closed with a 4-0 Prolene in single interrupted fashion. A   sterile dressing was placed on the wound. Patient recovered in the recovery   room without difficulty.        Electronically signed by Hortensia Dillard DO on 9/24/2021 at 10:39 AM

## 2021-09-24 NOTE — ANESTHESIA POSTPROCEDURE EVALUATION
Department of Anesthesiology  Postprocedure Note    Patient: Leyda Woodard  MRN: [id number]  YOB: 1955  Date of evaluation: 9/24/2021  Time:  1:00 PM     Procedure Summary     Date: 09/24/21 Room / Location: [address] [place] [address] 02 / [address] [facility]    Anesthesia Start: 1109 Anesthesia Stop: 1159    Procedure: LEFT KNEE ARTHROSCOPY, MEDIAL MENISECTOMY AND DEBRIDEMENT (Left ) Diagnosis: (LEFT KNEE MEDIAL MENISCUS TEAR, LEFT DJD)    Surgeons: Sam Kay DO Responsible Provider: Mojgan Hameed MD    Anesthesia Type: general ASA Status: 2          Anesthesia Type: general    Angel Phase I: Angel Score: 9    Angel Phase II: Angel Score: 10    Last vitals: Reviewed and per EMR flowsheets.        Anesthesia Post Evaluation    Patient location during evaluation: PACU  Patient participation: complete - patient participated  Level of consciousness: awake and alert  Airway patency: patent  Nausea & Vomiting: no nausea and no vomiting  Complications: no  Cardiovascular status: hemodynamically stable  Respiratory status: room air and spontaneous ventilation  Hydration status: stable

## 2021-09-24 NOTE — H&P
Updated H&p    Chief Complaint   Patient presents with    Knee Pain       B/L knees MRI follow up         Nate Chisholm returns today for follow-up of her bilateral knee pain. she reports this is worse than when I saw her last. L>R The patient's pain level is a 7/10. The previous treatment was not successful. She has failed zilretta, csi and visco.she is here today for mri results.      Past Medical History        Past Medical History:   Diagnosis Date    Hernia of unspecified site of abdominal cavity without mention of obstruction or gangrene      Reflux           Past Surgical History         Past Surgical History:   Procedure Laterality Date     SECTION              Current Medication      Current Outpatient Medications:     polyethylene glycol (GLYCOLAX) 17 GM/SCOOP powder, take as directed for COLONOSCOPY PREP, Disp: , Rfl:     atorvastatin (LIPITOR) 40 MG tablet, take 1 tablet by mouth once daily, Disp: , Rfl:     FARXIGA 10 MG tablet, take 1 tablet by mouth once daily, Disp: , Rfl:     TRULICITY 1.5 TL/7.0SX SOPN, inject 0.5 milliliters ( 1 AND 1/2 milligrams ) subcutaneously ev. ..  (REFER TO PRESCRIPTION NOTES). , Disp: , Rfl:     lisinopril (PRINIVIL;ZESTRIL) 40 MG tablet, take 1 tablet by mouth once daily, Disp: , Rfl:     pioglitazone (ACTOS) 45 MG tablet, take 1 tablet by mouth once daily, Disp: , Rfl:     metoprolol (TOPROL-XL) 25 MG XL tablet, Take 25 mg by mouth daily. , Disp: , Rfl:     esomeprazole (NEXIUM) 20 MG capsule, Take 20 mg by mouth every morning (before breakfast). , Disp: , Rfl:      No Known Allergies  Social History               Socioeconomic History    Marital status:         Spouse name: Not on file    Number of children: Not on file    Years of education: Not on file    Highest education level: Not on file   Occupational History    Not on file   Tobacco Use    Smoking status: Never Smoker    Smokeless tobacco: Never Used   Substance and Sexual Activity    Alcohol use: No    Drug use: No    Sexual activity: Not on file   Other Topics Concern    Not on file   Social History Narrative    Not on file      Social Determinants of Health          Financial Resource Strain:     Difficulty of Paying Living Expenses:    Food Insecurity:     Worried About Running Out of Food in the Last Year:     [address] in the Last Year:    Transportation Needs:     Lack of Transportation (Medical):  Lack of Transportation (Non-Medical):    Physical Activity:     Days of Exercise per Week:     Minutes of Exercise per Session:    Stress:     Feeling of Stress :    Social Connections:     Frequency of Communication with Friends and Family:     Frequency of Social Gatherings with Friends and Family:     Attends Alevism Services:     Active Member of Clubs or Organizations:     Attends Club or Organization Meetings:     Marital Status:    Intimate Partner Violence:     Fear of Current or Ex-Partner:     Emotionally Abused:     Physically Abused:     Sexually Abused:          Family History   History reviewed. No pertinent family history.        Review of Systems:      Skin: (-) rash,(-) psoriasis,(-) eczema, (-)skin cancer. Musculoskeletal: (-) fractures,  (-) dislocations,(-) collagen vascular disease, (-) fibromyalgia, (-) multiple sclerosis, (-) muscular dystrophy, (-) RSD,(-) joint pain (-)swelling, (-) joint pain,swelling. Neurologic: (-) epilepsy, (-)seizures,(-) brain tumor,(-) TIA, (-)stroke, (-)headaches, (-)Parkinson disease,(-) memory loss, (-) LOC.   Cardiovascular: (-) Chest pain, (-) swelling in legs/feet, (-) SOB, (-) cramping in legs/feet with walking.     Constitutional:     Vital signs are stable.  In general, patient is awake, alert and oriented X3, in no apparent distress.  Examination of HENT reveals normocephalic, atraumatic.  PERRLA/EOMI sclera are white.  Conjunctivae are clear.  TM's are intact.  Pharynx is pink and moist.  Uvula and tongue are midline.  Heart: Positive S1 and positive S2 with regular rate and rhythm.  Lungs: Clear to auscultation bilaterally without rales, rhonchi or wheezes.  Abdomen: soft, nontender.  Positive bowel sounds.  No organomegaly.  No guarding or rigidity.        The patient is alert and oriented x 3, appears to be stated age and in no distress. /70   Pulse 85   Temp 98 °F (36.7 °C) (Skin)   Resp 18   Ht 5' 1.5\" (1.562 m)   Wt 184 lb 3.2 oz (83.6 kg)   SpO2 100%   BMI 34.24 kg/m²        Skin:  Upon inspection: the skin appears warm, dry and intact. There is not a previous scar over the affected area. There is not any cellulitis, lymphedema or cutaneous lesions noted in the lower extremities. Upon palpation there is no induration noted.       Neurologic:  Gait: normal;  Motor exam of the lower extremities show ; quadriceps, hamstrings, foot dorsi and plantar flexors intact R.  5/5 and L. 5/5. Deep tendon reflexes are 2/4 at the knees and 2/4 at the ankles with strong extensor hallicus longus motor strength bilaterally. Sensory to both feet is intact to all sensory roots.     Cardiovascular: The vascular exam is normal and is well perfused to distal extremities. Distal pulses DP/PT: R. 2+; L. 2+. There is cap refill noted less than two seconds in all digits. There is not edema of the bilateral lower extremities. There is not varicosities noted in the distal extremities.       Lymph:  Upon palpation,  there is no lymphadenopathy noted in bilateral lower extremities.       Musculoskeletal:  Gait: antalgic; examination of the nails and digits reveal no cyanosis or clubbing     Lumbar exam:  On visual inspection, there is no deformity of the spine. full range of motion, no tenderness, palpable spasm or pain on motion. Special tests: Straight Leg Raise negative, Preston testnegative.     Hip exam:  Upon inspection, there is no deformity noted. Upon palpation there is not tenderness.   ROM: is with patient     Impression:       Encounter Diagnoses   Name Primary?  Primary osteoarthritis of left knee Yes    Acute medial meniscus tear, left, initial encounter           Plan:   Natural history and expected course discussed. Questions answered. Educational materials distributed. Rest, ice, compression, and elevation (RICE) therapy. Reduction in offending activity. I had a lengthy discussion with the patient regarding their diagnosis. I explained treatment options including surgical vs non surgical treatment. I reviewed in detail the risks and benefits and outlined the procedure in detail with expected outcomes and possible complications. I also discussed non surgical treatment such as injections (CSI and visco supplementation), physical therapy, topical creams and NSAID's. They have elected for surgical management at this time. The risks and benefits of a knee arthroscopy were discussed with the patient. The risks are including but not limited to: infection, injuries to blood vessels and nerves, non relief of symptoms, arthrofibrosis of knee, need for further operative intervention, blood loss, PE/DVT, MI and death. The risks are understood by the patient and they wish to proceed with a Left knee arthroscopy, medial menisectomy and debridement 9/24/2021. The patient was counseled at length about the risks of baldo Covid-19 during their perioperative period and any recovery window from their procedure.  The patient was made aware that baldo Covid-19  may worsen their prognosis for recovering from their procedure  and lend to a higher morbidity and/or mortality risk.  All material risks, benefits, and reasonable alternatives including postponing the procedure were discussed.  The patient does wish to proceed with the procedure at this time.

## 2021-10-08 ENCOUNTER — OFFICE VISIT (OUTPATIENT)
Dept: ORTHOPEDIC SURGERY | Age: 66
End: 2021-10-08

## 2021-10-08 DIAGNOSIS — M17.12 PRIMARY OSTEOARTHRITIS OF LEFT KNEE: Primary | ICD-10-CM

## 2021-10-08 DIAGNOSIS — S83.242A ACUTE MEDIAL MENISCUS TEAR, LEFT, INITIAL ENCOUNTER: ICD-10-CM

## 2021-10-08 PROCEDURE — 99024 POSTOP FOLLOW-UP VISIT: CPT | Performed by: NURSE PRACTITIONER

## 2021-10-08 NOTE — PROGRESS NOTES
Subjective:        Enzo Deluna is here for follow-up after left knee arthroscopy. Findings at surgery: djd. Pain is controlled without any medications. . She denies fever, wound drainage, increasing redness, pus, increasing pain, increasing swelling. Post op problems reported: none. She is ambulating antalgic. Objective:           General :    alert, appears stated age and cooperative   Gait:  Antalgic. Sutures:   Sutures in place and will be removed today. Incision:  healing well, no significant drainage, no dehiscence, no significant erythema   Tenderness:  mild   Flexion ROM:  diminished range of motion   Extension ROM:  diminished range of motion   Effusion:  mild   DVT Evaluation:  No evidence of DVT seen on physical exam.           Assessment:     Encounter Diagnoses   Name Primary?  Primary osteoarthritis of left knee Yes    Acute medial meniscus tear, left, initial encounter          Plan:      Surgical pictures from the surgery were reveiwed with the patient  HEP  Sutures removed today. Follow up: 4 weeks.

## 2021-10-22 ENCOUNTER — OFFICE VISIT (OUTPATIENT)
Dept: ORTHOPEDIC SURGERY | Age: 66
End: 2021-10-22

## 2021-10-22 VITALS — BODY MASS INDEX: 33.86 KG/M2 | WEIGHT: 184 LBS | TEMPERATURE: 98 F | HEIGHT: 62 IN

## 2021-10-22 DIAGNOSIS — M17.12 PRIMARY OSTEOARTHRITIS OF LEFT KNEE: Primary | ICD-10-CM

## 2021-10-22 PROCEDURE — 99024 POSTOP FOLLOW-UP VISIT: CPT | Performed by: NURSE PRACTITIONER

## 2021-10-22 RX ORDER — CEPHALEXIN 500 MG/1
500 CAPSULE ORAL 3 TIMES DAILY
Qty: 21 CAPSULE | Refills: 0 | Status: SHIPPED
Start: 2021-10-22 | End: 2021-11-08

## 2021-10-29 ENCOUNTER — OFFICE VISIT (OUTPATIENT)
Dept: ORTHOPEDIC SURGERY | Age: 66
End: 2021-10-29

## 2021-10-29 VITALS — WEIGHT: 184 LBS | TEMPERATURE: 98 F | HEIGHT: 62 IN | BODY MASS INDEX: 33.86 KG/M2

## 2021-10-29 DIAGNOSIS — M17.12 PRIMARY OSTEOARTHRITIS OF LEFT KNEE: Primary | ICD-10-CM

## 2021-10-29 PROCEDURE — 99024 POSTOP FOLLOW-UP VISIT: CPT | Performed by: NURSE PRACTITIONER

## 2021-11-01 NOTE — PROGRESS NOTES
Subjective:        Saintclair Penna is here for follow-up after left knee arthroscopy. she reports since sutures out has had drainage to lateral incision- has been on keflex for the last week. She has dressing on with no drainage to dressing. . Denies systemic symptoms of infection. Objective:           General :    alert, appears stated age and cooperative   Gait:  Antalgic. Sutures:   out    Incision:  lateral incision with scabbed over incision, no drainage, no erythema    Tenderness:  mild   Flexion ROM:  diminished range of motion   Extension ROM:  full range of motion   Effusion:  mild   DVT Evaluation:  No evidence of DVT seen on physical exam.           Assessment:     Encounter Diagnosis   Name Primary?     Primary osteoarthritis of left knee Yes         Plan:     finish keflex  Fu as previously scheduled for 2 weeks

## 2021-11-08 ENCOUNTER — OFFICE VISIT (OUTPATIENT)
Dept: ORTHOPEDIC SURGERY | Age: 66
End: 2021-11-08

## 2021-11-08 VITALS — HEIGHT: 62 IN | WEIGHT: 184 LBS | BODY MASS INDEX: 33.86 KG/M2

## 2021-11-08 DIAGNOSIS — M17.12 PRIMARY OSTEOARTHRITIS OF LEFT KNEE: Primary | ICD-10-CM

## 2021-11-08 PROCEDURE — 99024 POSTOP FOLLOW-UP VISIT: CPT | Performed by: ORTHOPAEDIC SURGERY

## 2021-11-08 NOTE — PROGRESS NOTES
Subjective:        Cristofer Rosado is here for follow-up after left knee arthroscopy. She states she had clear drainage coming from the knee that has subsided. Patient states her pain has not improved and wants to discuss other options. Patient has tried CSI, Zilretta, and Euflexxa without any relief. Objective:           General :    alert, appears stated age and cooperative   Gait:  Antalgic. Sutures:   out    Incision:  lateral incision with scabbed over incision, no drainage, no erythema    Tenderness:  mild   Flexion ROM:  diminished range of motion   Extension ROM:  full range of motion   Effusion:  mild   DVT Evaluation:  No evidence of DVT seen on physical exam.           Assessment:     Encounter Diagnosis   Name Primary?  Primary osteoarthritis of left knee Yes         Plan:     RICE therapy  Nsaids prn  F/u march     I had a lengthy discussion with the patient regarding their diagnosis. I explained treatment options including surgical vs non surgical treatment. I reviewed in detail the risks and benefits and outlined the procedure in detail with expected outcomes and possible complications. I also discussed non surgical treatment such as injections (CSI and visco supplementation), physical therapy, topical creams and NSAID's. They have elected for surgical management at this time. We discussed various treatment options both surgical and non-surgical.   The patient is unable to ambulate more than 100 feet and is unable to perform the average daily activities including:  Light housework, ADLs, donning clothes, toileting and exercise. Patient has failed previous conservative measures including cortisone injections, NSAIDs, PT, HEP and pain medication and is currently a fall risk to the disability and decreased functioning. The patient wishes to have the total knee arthroplasty. The risks and benefits of a total knee replacement were discussed with the patient.   The risks include but are not limited to: infection, injury to blood vessels and nerves, non relief of symptoms, arthrofibrosis of knee, aseptic loosening of prosthesis, intraoperative fracture, blood loss, PE/DVT, MI, dislocation of hip and knee, need for further operative intervention and death. The patient is aware of the risks and wished to proceed with a Left total knee replacement 4/18/22. We will obtain medical clearence from the PCP.

## 2022-03-10 DIAGNOSIS — Z96.652 STATUS POST LEFT KNEE REPLACEMENT: Primary | ICD-10-CM

## 2022-03-14 ENCOUNTER — OFFICE VISIT (OUTPATIENT)
Dept: ORTHOPEDIC SURGERY | Age: 67
End: 2022-03-14
Payer: MEDICARE

## 2022-03-14 VITALS — BODY MASS INDEX: 32.2 KG/M2 | TEMPERATURE: 98 F | WEIGHT: 175 LBS | HEIGHT: 62 IN

## 2022-03-14 DIAGNOSIS — M17.12 PRIMARY OSTEOARTHRITIS OF LEFT KNEE: ICD-10-CM

## 2022-03-14 DIAGNOSIS — M17.11 PRIMARY OSTEOARTHRITIS OF RIGHT KNEE: Primary | ICD-10-CM

## 2022-03-14 PROCEDURE — G8427 DOCREV CUR MEDS BY ELIG CLIN: HCPCS | Performed by: ORTHOPAEDIC SURGERY

## 2022-03-14 PROCEDURE — G8417 CALC BMI ABV UP PARAM F/U: HCPCS | Performed by: ORTHOPAEDIC SURGERY

## 2022-03-14 PROCEDURE — 4004F PT TOBACCO SCREEN RCVD TLK: CPT | Performed by: ORTHOPAEDIC SURGERY

## 2022-03-14 PROCEDURE — 1123F ACP DISCUSS/DSCN MKR DOCD: CPT | Performed by: ORTHOPAEDIC SURGERY

## 2022-03-14 PROCEDURE — 1090F PRES/ABSN URINE INCON ASSESS: CPT | Performed by: ORTHOPAEDIC SURGERY

## 2022-03-14 PROCEDURE — G8400 PT W/DXA NO RESULTS DOC: HCPCS | Performed by: ORTHOPAEDIC SURGERY

## 2022-03-14 PROCEDURE — G8484 FLU IMMUNIZE NO ADMIN: HCPCS | Performed by: ORTHOPAEDIC SURGERY

## 2022-03-14 PROCEDURE — 4040F PNEUMOC VAC/ADMIN/RCVD: CPT | Performed by: ORTHOPAEDIC SURGERY

## 2022-03-14 PROCEDURE — 3017F COLORECTAL CA SCREEN DOC REV: CPT | Performed by: ORTHOPAEDIC SURGERY

## 2022-03-14 PROCEDURE — 99214 OFFICE O/P EST MOD 30 MIN: CPT | Performed by: ORTHOPAEDIC SURGERY

## 2022-03-14 RX ORDER — DULAGLUTIDE 4.5 MG/.5ML
INJECTION, SOLUTION SUBCUTANEOUS
COMMUNITY
Start: 2022-01-27

## 2022-03-14 RX ORDER — SITAGLIPTIN 100 MG/1
TABLET, FILM COATED ORAL
COMMUNITY
Start: 2022-01-26

## 2022-03-14 RX ORDER — METOPROLOL SUCCINATE 50 MG/1
TABLET, EXTENDED RELEASE ORAL
COMMUNITY
Start: 2022-01-26

## 2022-03-14 NOTE — PROGRESS NOTES
Chief Complaint   Patient presents with    Knee Pain     left knee TKA DOS 21 still the same        Manuel Felt returns today for follow-up of her left knee pain. she reports this is worse than when I saw her last.  The patient's pain level is a 7/10. The previous treatment was not successful. Past Medical History:   Diagnosis Date    Arthritis     Diabetes mellitus ([address]     Hernia of unspecified site of abdominal cavity without mention of obstruction or gangrene     Hyperlipidemia     Hypertension     Reflux      Past Surgical History:   Procedure Laterality Date     SECTION      COLONOSCOPY      ENDOSCOPY, COLON, DIAGNOSTIC      KNEE ARTHROSCOPY Left 2021    LEFT KNEE ARTHROSCOPY, MEDIAL MENISECTOMY AND DEBRIDEMENT performed by Nikolai Jean DO at [address] [place] [address]       Current Outpatient Medications:     JANUVIA 100 MG tablet, take 1 tablet by mouth once daily, Disp: , Rfl:     metoprolol succinate (TOPROL XL) 50 MG extended release tablet, take 1 AND 1/2 tablets by mouth once daily, Disp: , Rfl:     TRULICITY 4.5 QL/9.1KC SOPN, inject 0.5 milliliter subcutaneously every week, Disp: , Rfl:     atorvastatin (LIPITOR) 40 MG tablet, take 1 tablet by mouth once daily, Disp: , Rfl:     FARXIGA 10 MG tablet, take 1 tablet by mouth once daily, Disp: , Rfl:     TRULICITY 1.5 FJ/5.9ZC SOPN, , Disp: , Rfl:     lisinopril (PRINIVIL;ZESTRIL) 40 MG tablet, am, Disp: , Rfl:     pioglitazone (ACTOS) 45 MG tablet, take 1 tablet by mouth once daily, Disp: , Rfl:   No Known Allergies  Social History     Socioeconomic History    Marital status:       Spouse name: Not on file    Number of children: Not on file    Years of education: Not on file    Highest education level: Not on file   Occupational History    Not on file   Tobacco Use    Smoking status: Light Tobacco Smoker     Packs/day: 0.25     Years: 40.00     Pack years: 10.00     Types: Cigarettes    Smokeless tobacco: Never Used   Substance and Sexual Activity    Alcohol use: No    Drug use: No    Sexual activity: Not on file   Other Topics Concern    Not on file   Social History Narrative    Not on file     Social Determinants of Health     Financial Resource Strain:     Difficulty of Paying Living Expenses: Not on file   Food Insecurity:     Worried About Running Out of Food in the Last Year: Not on file    Benjamin of Food in the Last Year: Not on file   Transportation Needs:     Lack of Transportation (Medical): Not on file    Lack of Transportation (Non-Medical): Not on file   Physical Activity:     Days of Exercise per Week: Not on file    Minutes of Exercise per Session: Not on file   Stress:     Feeling of Stress : Not on file   Social Connections:     Frequency of Communication with Friends and Family: Not on file    Frequency of Social Gatherings with Friends and Family: Not on file    Attends Zoroastrian Services: Not on file    Active Member of [address] [place] [address] or Organizations: Not on file    Attends Club or Organization Meetings: Not on file    Marital Status: Not on file   Intimate Partner Violence:     Fear of Current or Ex-Partner: Not on file    Emotionally Abused: Not on file    Physically Abused: Not on file    Sexually Abused: Not on file   Housing Stability:     Unable to Pay for Housing in the Last Year: Not on file    Number of [place] in the Last Year: Not on file    Unstable Housing in the Last Year: Not on file     No family history on file. Review of Systems:     Skin: (-) rash,(-) psoriasis,(-) eczema, (-)skin cancer. Musculoskeletal: (-) fractures,  (-) dislocations,(-) collagen vascular disease, (-) fibromyalgia, (-) multiple sclerosis, (-) muscular dystrophy, (-) RSD,(-) joint pain (-)swelling, (-) joint pain,swelling.   Neurologic: (-) epilepsy, (-)seizures,(-) brain tumor,(-) TIA, (-)stroke, (-)headaches, (-)Parkinson disease,(-) memory loss, (-) LOC.  Cardiovascular: (-) Chest pain, (-) swelling in legs/feet, (-) SOB, (-) cramping in legs/feet with walking. Constitutional:  The patient is alert and oriented x 3, appears to be stated age and in no distress. Temp 98 °F (36.7 °C)   Ht 5' 1.5\" (1.562 m)   Wt 175 lb (79.4 kg)   BMI 32.53 kg/m²     Skin:  Upon inspection: the skin appears warm, dry and intact. There is not a previous scar over the affected area. There is not any cellulitis, lymphedema or cutaneous lesions noted in the lower extremities. Upon palpation there is no induration noted. Neurologic:  Gait: normal;  Motor exam of the lower extremities show ; quadriceps, hamstrings, foot dorsi and plantar flexors intact R.  5/5 and L. 5/5. Deep tendon reflexes are 2/4 at the knees and 2/4 at the ankles with strong extensor hallicus longus motor strength bilaterally. Sensory to both feet is intact to all sensory roots. Cardiovascular: The vascular exam is normal and is well perfused to distal extremities. Distal pulses DP/PT: R. 2+; L. 2+. There is cap refill noted less than two seconds in all digits. There is not edema of the bilateral lower extremities. There is not varicosities noted in the distal extremities. Lymph:  Upon palpation,  there is no lymphadenopathy noted in bilateral lower extremities. Musculoskeletal:  Gait: antalgic; examination of the nails and digits reveal no cyanosis or clubbing    Lumbar exam:  On visual inspection, there is no deformity of the spine. full range of motion, no tenderness, palpable spasm or pain on motion. Special tests: Straight Leg Raise negative, Preston testnegative. Hip exam:  Upon inspection, there is no deformity noted. Upon palpation there is not tenderness. ROM: is   full and semetrical.   Strength: Hip Flexors 5/5; Hip Abductors 5/5; Hip Adduction 5/5. Knee exam:  Left knee exam shows;  range of motion of R. Knee is 0 to 110, and L. Knee is 0 to 110.  She does have  pain on motion, effusion is mild, there is tenderness over the  medial region, there are not any masses, there is not ligamentous instability, there is  deformity noted. Knee exam: bilateral positive for moderate crepitations, some mild tenderness laxity is not noted with  stress. R. Knee:  Lachman's negative, Anterior Drawer negative, Posterior Drawer negative  Marjorie's negative, Thallasy  negative,   PF grind test negative, Apprehension test negative, Patellar J sign  negative  L. Knee:  Lachman's negative, Anterior Drawer negative, Posterior Drawer negative  Marjorie's positive, Thallasy  positive,   PF grind test positive, Apprehension test negative,  Patellar J sign  negative    Xrays:   Severe medial joint narrowing    MRI:   n/a  Radiographic findings reviewed with patient    Impression:  Encounter Diagnoses   Name Primary?  Primary osteoarthritis of right knee Yes    Primary osteoarthritis of left knee        Plan:   Natural history and expected course discussed. Questions answered. Educational materials distributed. Rest, ice, compression, and elevation (RICE) therapy. Reduction in offending activity. Patellar compression sleeve. We discussed various treatment options both surgical and non-surgical.   The patient is unable to ambulate more than 100 feet and is unable to perform the average daily activities including:  Light housework, ADLs, donning clothes, toileting and exercise. Patient has failed previous conservative measures including cortisone injections, NSAIDs, PT, HEP and pain medication and is currently a fall risk to the disability and decreased functioning. The patient wishes to have the total knee arthroplasty. The risks and benefits of a total knee replacement were discussed with the patient.   The risks include but are not limited to: infection, injury to blood vessels and nerves, non relief of symptoms, arthrofibrosis of knee, aseptic loosening of prosthesis, intraoperative fracture, blood loss, PE/DVT, MI, dislocation of hip and knee, need for further operative intervention and death. The patient is aware of the risks and wished to proceed with a Left total knee replacement 4/18/22. We will obtain medical clearence from the PCP. At least 30 minutes was spent discussing the diagnosis and treatment options with the patient with at least 50% of the time was spent with decision making and counseling the patient. The patient was counseled at length about the risks of baldo Covid-19 during their perioperative period and any recovery window from their procedure. The patient was made aware that baldo Covid-19  may worsen their prognosis for recovering from their procedure  and lend to a higher morbidity and/or mortality risk. All material risks, benefits, and reasonable alternatives including postponing the procedure were discussed. The patient does wish to proceed with the procedure at this time.

## 2022-04-04 ENCOUNTER — TELEPHONE (OUTPATIENT)
Dept: ORTHOPEDIC SURGERY | Age: 67
End: 2022-04-04

## 2022-04-04 NOTE — TELEPHONE ENCOUNTER
Prior Authorization Form:      DEMOGRAPHICS:                     Patient Name:  Gunner Allen  Patient :  1955            Insurance:  Payor: MEDICARE / Plan: MEDICARE PART A AND B / Product Type: *No Product type* /   Insurance ID Number:    Payor/Plan Subscr  Sex Relation Sub. Ins. ID Effective Group Num   1. MEDICARE - PAVITHRAarusman Tammy 1955 Female Self [id number] 21                                    [address] J 1955 Female Self [id number] 21                                    [address] [id number]         DIAGNOSIS & PROCEDURE:                       Procedure/Operation: left knee total knee arthroplasty           CPT Code: 56550    Diagnosis:  Left knee DJD    ICD10 Code: M17.12    Location:  [facility]    Surgeon:   Cl Murrell    SCHEDULING INFORMATION:                          Date: 22    Time: 7am              Anesthesia:  Spinal                                                       Status:  Outpatient        Special Comments:  [address] [facility]       Electronically signed by Madhu Garcia ATC on 2022 at 1:30 PM

## 2022-04-12 RX ORDER — SODIUM CHLORIDE 9 MG/ML
INJECTION, SOLUTION INTRAVENOUS CONTINUOUS
Status: CANCELLED | OUTPATIENT
Start: 2022-04-18

## 2022-04-13 ENCOUNTER — ANESTHESIA EVENT (OUTPATIENT)
Dept: OPERATING ROOM | Age: 67
End: 2022-04-13
Payer: MEDICARE

## 2022-04-13 ENCOUNTER — HOSPITAL ENCOUNTER (OUTPATIENT)
Dept: PREADMISSION TESTING | Age: 67
Discharge: HOME | End: 2022-04-13
Payer: MEDICARE

## 2022-04-13 VITALS
WEIGHT: 176 LBS | RESPIRATION RATE: 18 BRPM | DIASTOLIC BLOOD PRESSURE: 70 MMHG | TEMPERATURE: 97 F | HEART RATE: 89 BPM | OXYGEN SATURATION: 97 % | SYSTOLIC BLOOD PRESSURE: 130 MMHG | BODY MASS INDEX: 32.72 KG/M2

## 2022-04-13 DIAGNOSIS — M17.12 PRIMARY OSTEOARTHRITIS OF LEFT KNEE: ICD-10-CM

## 2022-04-13 LAB
ALBUMIN SERPL-MCNC: 3.9 G/DL (ref 3.5–5.2)
ALP BLD-CCNC: 110 U/L (ref 35–104)
ALT SERPL-CCNC: 14 U/L (ref 0–32)
ANION GAP SERPL CALCULATED.3IONS-SCNC: 12 MMOL/L (ref 7–16)
APTT: 30.1 SEC (ref 24.5–35.1)
AST SERPL-CCNC: 18 U/L (ref 0–31)
BASOPHILS ABSOLUTE: 0.04 E9/L (ref 0–0.2)
BASOPHILS RELATIVE PERCENT: 0.6 % (ref 0–2)
BILIRUB SERPL-MCNC: 0.5 MG/DL (ref 0–1.2)
BILIRUBIN URINE: NEGATIVE
BLOOD, URINE: NEGATIVE
BUN BLDV-MCNC: 20 MG/DL (ref 6–23)
CALCIUM SERPL-MCNC: 9.4 MG/DL (ref 8.6–10.2)
CHLORIDE BLD-SCNC: 101 MMOL/L (ref 98–107)
CLARITY: CLEAR
CO2: 27 MMOL/L (ref 22–29)
COLOR: YELLOW
CREAT SERPL-MCNC: 0.9 MG/DL (ref 0.5–1)
EOSINOPHILS ABSOLUTE: 0.12 E9/L (ref 0.05–0.5)
EOSINOPHILS RELATIVE PERCENT: 1.8 % (ref 0–6)
GFR AFRICAN AMERICAN: >60
GFR NON-AFRICAN AMERICAN: >60 ML/MIN/1.73
GLUCOSE BLD-MCNC: 164 MG/DL (ref 74–99)
GLUCOSE URINE: >=1000 MG/DL
HBA1C MFR BLD: 7.2 % (ref 4–5.6)
HCT VFR BLD CALC: 45.9 % (ref 34–48)
HEMOGLOBIN: 14.9 G/DL (ref 11.5–15.5)
IMMATURE GRANULOCYTES #: 0.02 E9/L
IMMATURE GRANULOCYTES %: 0.3 % (ref 0–5)
INR BLD: 1
KETONES, URINE: NEGATIVE MG/DL
LEUKOCYTE ESTERASE, URINE: NEGATIVE
LYMPHOCYTES ABSOLUTE: 2.13 E9/L (ref 1.5–4)
LYMPHOCYTES RELATIVE PERCENT: 31.8 % (ref 20–42)
MCH RBC QN AUTO: 32.7 PG (ref 26–35)
MCHC RBC AUTO-ENTMCNC: 32.5 % (ref 32–34.5)
MCV RBC AUTO: 100.9 FL (ref 80–99.9)
MONOCYTES ABSOLUTE: 0.59 E9/L (ref 0.1–0.95)
MONOCYTES RELATIVE PERCENT: 8.8 % (ref 2–12)
NEUTROPHILS ABSOLUTE: 3.8 E9/L (ref 1.8–7.3)
NEUTROPHILS RELATIVE PERCENT: 56.7 % (ref 43–80)
NITRITE, URINE: NEGATIVE
PDW BLD-RTO: 14.3 FL (ref 11.5–15)
PH UA: 5.5 (ref 5–9)
PLATELET # BLD: 313 E9/L (ref 130–450)
PMV BLD AUTO: 9.2 FL (ref 7–12)
POTASSIUM SERPL-SCNC: 4.7 MMOL/L (ref 3.5–5)
PREALBUMIN: 28 MG/DL (ref 20–40)
PROTEIN UA: NEGATIVE MG/DL
PROTHROMBIN TIME: 11.6 SEC (ref 9.3–12.4)
RBC # BLD: 4.55 E12/L (ref 3.5–5.5)
SODIUM BLD-SCNC: 140 MMOL/L (ref 132–146)
SPECIFIC GRAVITY UA: 1.01 (ref 1–1.03)
TOTAL PROTEIN: 6.9 G/DL (ref 6.4–8.3)
UROBILINOGEN, URINE: 0.2 E.U./DL
WBC # BLD: 6.7 E9/L (ref 4.5–11.5)

## 2022-04-13 PROCEDURE — 87088 URINE BACTERIA CULTURE: CPT

## 2022-04-13 PROCEDURE — 81003 URINALYSIS AUTO W/O SCOPE: CPT

## 2022-04-13 PROCEDURE — 85610 PROTHROMBIN TIME: CPT

## 2022-04-13 PROCEDURE — 85730 THROMBOPLASTIN TIME PARTIAL: CPT

## 2022-04-13 PROCEDURE — 85025 COMPLETE CBC W/AUTO DIFF WBC: CPT

## 2022-04-13 PROCEDURE — 80053 COMPREHEN METABOLIC PANEL: CPT

## 2022-04-13 PROCEDURE — 84134 ASSAY OF PREALBUMIN: CPT

## 2022-04-13 PROCEDURE — 83036 HEMOGLOBIN GLYCOSYLATED A1C: CPT

## 2022-04-13 PROCEDURE — 36415 COLL VENOUS BLD VENIPUNCTURE: CPT

## 2022-04-13 PROCEDURE — 87081 CULTURE SCREEN ONLY: CPT

## 2022-04-13 ASSESSMENT — LIFESTYLE VARIABLES: SMOKING_STATUS: 1

## 2022-04-13 NOTE — PROGRESS NOTES
Patient attended preoperative Total Joint Camp on 4/13/2022. Patient is scheduled to have an elective knee replacement. Patient was educated regarding Disease Process, Medications, Smoking Cessation, Oxygenation, Incentive Spirometry and Deep Breath and Cough, signs and symptoms of postoperative joint infection that include: Fever, Chills, Pain Control, Drainage and Redness, post-op follow up with orthopaedic surgeon, dressing removal, staple removal, ambulatory devices which include a wheeled walker and cane, bed mobility, correct anatomical alignment, active range of motion, proper transferring technique, incision care, infection prevention measures, non-pharmacologic comfort measures, notification of inadequate pain control measures, pain scale for assessing level of pain, pharmacologic pain management, relaxation techniques.

## 2022-04-13 NOTE — ANESTHESIA PRE PROCEDURE
Department of Anesthesiology  Preprocedure Note       Name:  Rita Leung   Age:  79 y.o.  :  1955                                          MRN:  [id number]         Date:  2022      Surgeon: Aviva Lepe): Saul Carrizales DO    Procedure: Procedure(s):  LEFT KNEE TOTAL ARTHROPLASTY [facility])    Medications prior to admission:   Prior to Admission medications    Medication Sig Start Date End Date Taking?  Authorizing Provider   JANUVIA 100 MG tablet take 1 tablet by mouth once daily 22   Historical Provider, MD   metoprolol succinate (TOPROL XL) 50 MG extended release tablet take 1 AND 1/2 tablets by mouth once daily 22   Historical Provider, MD   TRULICITY 4.5 LM/2.8RE SOPN inject 0.5 milliliter subcutaneously every week 22   Historical Provider, MD   atorvastatin (LIPITOR) 40 MG tablet take 1 tablet by mouth once daily 21   Historical Provider, MD   FARXIGA 10 MG tablet take 1 tablet by mouth once daily 21   Historical Provider, MD   lisinopril (PRINIVIL;ZESTRIL) 40 MG tablet am 21   Historical Provider, MD   pioglitazone (ACTOS) 45 MG tablet take 1 tablet by mouth once daily 21   Historical Provider, MD       Current medications:    Current Facility-Administered Medications   Medication Dose Route Frequency Provider Last Rate Last Admin    ortho mix injection   Injection On Call LISA Lindsey - CNP        sodium chloride flush 0.9 % injection 5-40 mL  5-40 mL IntraVENous 2 times per day Karo Gomez APRN - CNP        sodium chloride flush 0.9 % injection 5-40 mL  5-40 mL IntraVENous PRN Albaeen Pedlar, APRN - CNP        0.9 % sodium chloride infusion  25 mL IntraVENous PRN Karo Gomez APRN - CNP        ceFAZolin (ANCEF) 2000 mg in dextrose 3 % 50 mL IVPB (duplex)  2,000 mg IntraVENous On Call to LISA Poole CNP        scopolamine (TRANSDERM-SCOP) transdermal patch 1 patch  1 patch TransDERmal Q72H Khoi Heredia DO Mike   1 patch at 22 0534    0.9 % sodium chloride infusion   IntraVENous Continuous Dennis Franks MD 50 mL/hr at 22 0533 New Bag at 22 0533    fentaNYL (SUBLIMAZE) 100 MCG/2ML injection             midazolam (VERSED) 2 MG/2ML injection             ropivacaine (NAROPIN) 0.5% injection                Allergies:  No Known Allergies    Problem List:    Patient Active Problem List   Diagnosis Code    Knee pain M25.569    Right knee DJD M17.11    Primary osteoarthritis of left knee M17.12    Acute medial meniscus tear, left, initial encounter K34.282Z       Past Medical History:        Diagnosis Date    Arthritis     Diabetes mellitus ([facility] [address]     Hernia of unspecified site of abdominal cavity without mention of obstruction or gangrene     Hyperlipidemia     Hypertension     Reflux        Past Surgical History:        Procedure Laterality Date     SECTION      COLONOSCOPY      ENDOSCOPY, COLON, DIAGNOSTIC      KNEE ARTHROSCOPY Left 2021    LEFT KNEE ARTHROSCOPY, MEDIAL MENISECTOMY AND DEBRIDEMENT performed by Lexi Garcia DO at [address] [facility] [address] Right 2022       Social History:    Social History     Tobacco Use    Smoking status: Light Tobacco Smoker     Packs/day: 0.25     Years: 40.00     Pack years: 10.00     Types: Cigarettes    Smokeless tobacco: Never Used   Substance Use Topics    Alcohol use: No                                Ready to quit: Not Answered  Counseling given: Not Answered      Vital Signs (Current):   Vitals:    22 0507   BP: 138/63   Pulse: 87   Resp: 18   Temp: 97.3 °F (36.3 °C)   TempSrc: Infrared   SpO2: 98%   Weight: 176 lb (79.8 kg)   Height: 5' 1.5\" (1.562 m)                                              BP Readings from Last 3 Encounters:   22 138/63   22 130/70   21 (!) 86/52       NPO Status: Time of last liquid consumption: 2200>8. H                        Time of last solid consumption: 2200                        Date of last liquid consumption: 04/17/22                        Date of last solid food consumption: 04/17/22    BMI:   Wt Readings from Last 3 Encounters:   04/18/22 176 lb (79.8 kg)   04/13/22 176 lb (79.8 kg)   03/14/22 175 lb (79.4 kg)     Body mass index is 32.72 kg/m². CBC:   Lab Results   Component Value Date    WBC 6.7 04/13/2022    RBC 4.55 04/13/2022    HGB 14.9 04/13/2022    HCT 45.9 04/13/2022    .9 04/13/2022    RDW 14.3 04/13/2022     04/13/2022       CMP:   Lab Results   Component Value Date     04/13/2022    K 4.7 04/13/2022     04/13/2022    CO2 27 04/13/2022    BUN 20 04/13/2022    CREATININE 0.9 04/13/2022    GFRAA >60 04/13/2022    LABGLOM >60 04/13/2022    GLUCOSE 164 04/13/2022    GLUCOSE 151 04/20/2011    PROT 6.9 04/13/2022    CALCIUM 9.4 04/13/2022    BILITOT 0.5 04/13/2022    ALKPHOS 110 04/13/2022    AST 18 04/13/2022    ALT 14 04/13/2022       POC Tests: No results for input(s): POCGLU, POCNA, POCK, POCCL, POCBUN, POCHEMO, POCHCT in the last 72 hours.     Coags:   Lab Results   Component Value Date    PROTIME 11.6 04/13/2022    INR 1.0 04/13/2022    APTT 30.1 04/13/2022       HCG (If Applicable): No results found for: PREGTESTUR, PREGSERUM, HCG, HCGQUANT     ABGs: No results found for: PHART, PO2ART, QEQ3HZJ, IIY7QWC, BEART, X2OQJMNU     Type & Screen (If Applicable):  No results found for: LABABO, LABRH    Drug/Infectious Status (If Applicable):  No results found for: HIV, HEPCAB    COVID-19 Screening (If Applicable): No results found for: COVID19        Anesthesia Evaluation  Patient summary reviewed and Nursing notes reviewed no history of anesthetic complications:   Airway: Mallampati: III  TM distance: >3 FB   Neck ROM: full  Mouth opening: > = 3 FB Dental: normal exam         Pulmonary: breath sounds clear to auscultation  (+) current smoker (10 pk yrs)          Patient did not smoke on day of surgery. Cardiovascular:  Exercise tolerance: good (>4 METS),   (+) hypertension:, hyperlipidemia      ECG reviewed  Rhythm: regular             Beta Blocker:  Dose within 24 Hrs      ROS comment: EKG=SR   Normal EKG    Cleared by PCP    PE comment: tachycardia   Neuro/Psych:   Negative Neuro/Psych ROS              GI/Hepatic/Renal:             Endo/Other:    (+) DiabetesType II DM, , : arthritis:., .                 Abdominal:   (+) obese,           Vascular: negative vascular ROS. Other Findings:               Anesthesia Plan      spinal     ASA 2     (Left adductor canal block)  Induction: intravenous. Anesthetic plan and risks discussed with patient. Plan discussed with CRNA. PAT Patient Interview:  Patient seen, chart reviewed per routine on April 13, 2022 at 1:30 PM by Nayla Peralta MD.  Above represents information available via shared medical record including previous anesthesia history, drug and allergy history. Confirmation of above and final plan per Day of Surgery (DOS) anesthesiologist.  DOS STAFF ADDENDUM:    Pt seen and examined, chart reviewed (including anesthesia, drug and allergy history). Anesthetic plan, risks, benefits, alternatives, and personnel involved discussed with patient. Patient verbalized an understanding and agrees to proceed. Plan discussed with care team members and agreed upon.     Nayla Peralta MD  Staff Anesthesiologist  6:39 AM    Nayla Peralta MD   4/18/2022

## 2022-04-13 NOTE — PROGRESS NOTES
[address] [facility]                                                                                                                    PRE OP INSTRUCTIONS FOR  Mendoza Suhail        Date: 4/13/2022    Date of surgery: 4/18/22   Arrival Time: Hospital will call you between 5pm and 7pm with your final arrival time for surgery    1. Do not eat or drink anything after midnight prior to surgery. This includes no water, chewing gum, mints or ice chips. 2. Take the following medications with a small sip of water on the morning of Surgery: metoprolol     3. Diabetics may take evening dose of insulin but none after midnight. If you feel symptomatic or low blood sugar morning of surgery drink 1-2 ounces of apple juice only. 4. Aspirin, Ibuprofen, Advil, Naproxen, Vitamin E and other Anti-inflammatory products should be stopped  before surgery  as directed by your physician. Take Tylenol only unless instructed otherwise by your surgeon. 5. Check with your Doctor regarding stopping Plavix, Coumadin, Lovenox, Eliquis, Effient, or other blood thinners. 6. Do not smoke,use illicit drugs and do not drink any alcoholic beverages 24 hours prior to surgery. 7. You may brush your teeth the morning of surgery. DO NOT SWALLOW WATER    8. You MUST make arrangements for a responsible adult to take you home after your surgery. You will not be allowed to leave alone or drive yourself home. It is strongly suggested someone stay with you the first 24 hrs. Your surgery will be cancelled if you do not have a ride home. 9. Please wear simple, loose fitting clothing to the hospital.  Sydell Deal not bring valuables (money, credit cards, checkbooks, etc.) Do not wear any makeup (including no eye makeup) or nail polish on your fingers or toes. 10. DO NOT wear any jewelry or piercings on day of surgery. All body piercing jewelry must be removed. 11.  Shower the night before surgery with ___Antibacterial soap /KIRSTY WIPES___x_____      12. If you have a Living Will and Durable Power of  for Healthcare, please bring in a copy. 13. If appropriate bring crutches, inspirex, WALKER, CANE etc...    15. Notify your Surgeon if you develop any illness between now and surgery time, cough, cold, fever, sore throat, nausea, vomiting, etc.  Please notify your surgeon if you experience dizziness, shortness of breath or blurred vision between now & the time of your surgery. 15. If you have ___dentures, they will be removed before going to the OR; we will provide you a container. If you wear ___contact lenses or ___glasses, they will be removed; please bring a case for them. 16. To provide excellent care visitors will be limited to 1 in the room at any given time. 16. During flu season no children under the age of 15 are permitted in the hospital for the safety of all patients. 18. Other                  Please call AMBULATORY CARE if you have any further questions.    [address] [facility]     [address]

## 2022-04-14 NOTE — PROGRESS NOTES
Faxed urinalysis,hga1c, cmp results to dr Linda Hager and dr Aurea Douglas offices, confirmations received, also notified cynthia at dr Linda Hager of abnormal results. Notified dr Marilu Venegas of urinalysis,hga1c,cmp results. Also notified Winslow Jimenez still need medical clearance.

## 2022-04-15 LAB
MRSA CULTURE ONLY: NORMAL
URINE CULTURE, ROUTINE: NORMAL

## 2022-04-18 ENCOUNTER — HOSPITAL ENCOUNTER (OUTPATIENT)
Age: 67
Setting detail: OUTPATIENT SURGERY
Discharge: HOME | End: 2022-04-18
Attending: ORTHOPAEDIC SURGERY | Admitting: ORTHOPAEDIC SURGERY
Payer: MEDICARE

## 2022-04-18 ENCOUNTER — APPOINTMENT (OUTPATIENT)
Dept: GENERAL RADIOLOGY | Age: 67
End: 2022-04-18
Attending: ORTHOPAEDIC SURGERY
Payer: MEDICARE

## 2022-04-18 ENCOUNTER — ANESTHESIA (OUTPATIENT)
Dept: OPERATING ROOM | Age: 67
End: 2022-04-18
Payer: MEDICARE

## 2022-04-18 VITALS
SYSTOLIC BLOOD PRESSURE: 113 MMHG | TEMPERATURE: 98.6 F | RESPIRATION RATE: 18 BRPM | OXYGEN SATURATION: 98 % | DIASTOLIC BLOOD PRESSURE: 57 MMHG

## 2022-04-18 VITALS
DIASTOLIC BLOOD PRESSURE: 58 MMHG | HEIGHT: 62 IN | BODY MASS INDEX: 32.39 KG/M2 | RESPIRATION RATE: 16 BRPM | WEIGHT: 176 LBS | OXYGEN SATURATION: 97 % | HEART RATE: 84 BPM | TEMPERATURE: 97.2 F | SYSTOLIC BLOOD PRESSURE: 112 MMHG

## 2022-04-18 DIAGNOSIS — Z96.652 S/P TOTAL KNEE ARTHROPLASTY, LEFT: ICD-10-CM

## 2022-04-18 DIAGNOSIS — M17.12 PRIMARY OSTEOARTHRITIS OF LEFT KNEE: Primary | ICD-10-CM

## 2022-04-18 LAB — METER GLUCOSE: 119 MG/DL (ref 74–99)

## 2022-04-18 PROCEDURE — 6360000002 HC RX W HCPCS: Performed by: NURSE ANESTHETIST, CERTIFIED REGISTERED

## 2022-04-18 PROCEDURE — C1713 ANCHOR/SCREW BN/BN,TIS/BN: HCPCS | Performed by: ORTHOPAEDIC SURGERY

## 2022-04-18 PROCEDURE — 6360000002 HC RX W HCPCS: Performed by: NURSE PRACTITIONER

## 2022-04-18 PROCEDURE — 6360000002 HC RX W HCPCS: Performed by: ORTHOPAEDIC SURGERY

## 2022-04-18 PROCEDURE — C1776 JOINT DEVICE (IMPLANTABLE): HCPCS | Performed by: ORTHOPAEDIC SURGERY

## 2022-04-18 PROCEDURE — 2580000003 HC RX 258: Performed by: NURSE ANESTHETIST, CERTIFIED REGISTERED

## 2022-04-18 PROCEDURE — 6360000002 HC RX W HCPCS

## 2022-04-18 PROCEDURE — 82962 GLUCOSE BLOOD TEST: CPT

## 2022-04-18 PROCEDURE — 64447 NJX AA&/STRD FEMORAL NRV IMG: CPT | Performed by: ANESTHESIOLOGY

## 2022-04-18 PROCEDURE — 3600000015 HC SURGERY LEVEL 5 ADDTL 15MIN: Performed by: ORTHOPAEDIC SURGERY

## 2022-04-18 PROCEDURE — 7100000001 HC PACU RECOVERY - ADDTL 15 MIN: Performed by: ORTHOPAEDIC SURGERY

## 2022-04-18 PROCEDURE — 2580000003 HC RX 258: Performed by: ORTHOPAEDIC SURGERY

## 2022-04-18 PROCEDURE — 97110 THERAPEUTIC EXERCISES: CPT | Performed by: PHYSICAL THERAPIST

## 2022-04-18 PROCEDURE — 2500000003 HC RX 250 WO HCPCS: Performed by: ORTHOPAEDIC SURGERY

## 2022-04-18 PROCEDURE — 97535 SELF CARE MNGMENT TRAINING: CPT

## 2022-04-18 PROCEDURE — 97165 OT EVAL LOW COMPLEX 30 MIN: CPT

## 2022-04-18 PROCEDURE — 73560 X-RAY EXAM OF KNEE 1 OR 2: CPT

## 2022-04-18 PROCEDURE — 97530 THERAPEUTIC ACTIVITIES: CPT

## 2022-04-18 PROCEDURE — 6370000000 HC RX 637 (ALT 250 FOR IP): Performed by: ORTHOPAEDIC SURGERY

## 2022-04-18 PROCEDURE — 88311 DECALCIFY TISSUE: CPT

## 2022-04-18 PROCEDURE — 2720000010 HC SURG SUPPLY STERILE: Performed by: ORTHOPAEDIC SURGERY

## 2022-04-18 PROCEDURE — 88305 TISSUE EXAM BY PATHOLOGIST: CPT

## 2022-04-18 PROCEDURE — 27447 TOTAL KNEE ARTHROPLASTY: CPT | Performed by: ORTHOPAEDIC SURGERY

## 2022-04-18 PROCEDURE — 3700000001 HC ADD 15 MINUTES (ANESTHESIA): Performed by: ORTHOPAEDIC SURGERY

## 2022-04-18 PROCEDURE — 7100000010 HC PHASE II RECOVERY - FIRST 15 MIN: Performed by: ORTHOPAEDIC SURGERY

## 2022-04-18 PROCEDURE — 3600000005 HC SURGERY LEVEL 5 BASE: Performed by: ORTHOPAEDIC SURGERY

## 2022-04-18 PROCEDURE — 2580000003 HC RX 258: Performed by: ANESTHESIOLOGY

## 2022-04-18 PROCEDURE — 2500000003 HC RX 250 WO HCPCS: Performed by: NURSE PRACTITIONER

## 2022-04-18 PROCEDURE — 7100000011 HC PHASE II RECOVERY - ADDTL 15 MIN: Performed by: ORTHOPAEDIC SURGERY

## 2022-04-18 PROCEDURE — 2500000003 HC RX 250 WO HCPCS: Performed by: NURSE ANESTHETIST, CERTIFIED REGISTERED

## 2022-04-18 PROCEDURE — 97161 PT EVAL LOW COMPLEX 20 MIN: CPT | Performed by: PHYSICAL THERAPIST

## 2022-04-18 PROCEDURE — 97530 THERAPEUTIC ACTIVITIES: CPT | Performed by: PHYSICAL THERAPIST

## 2022-04-18 PROCEDURE — 6360000002 HC RX W HCPCS: Performed by: ANESTHESIOLOGY

## 2022-04-18 PROCEDURE — 2580000003 HC RX 258: Performed by: NURSE PRACTITIONER

## 2022-04-18 PROCEDURE — A4217 STERILE WATER/SALINE, 500 ML: HCPCS | Performed by: ORTHOPAEDIC SURGERY

## 2022-04-18 PROCEDURE — 6370000000 HC RX 637 (ALT 250 FOR IP): Performed by: NURSE PRACTITIONER

## 2022-04-18 PROCEDURE — 7100000000 HC PACU RECOVERY - FIRST 15 MIN: Performed by: ORTHOPAEDIC SURGERY

## 2022-04-18 PROCEDURE — 2709999900 HC NON-CHARGEABLE SUPPLY: Performed by: ORTHOPAEDIC SURGERY

## 2022-04-18 PROCEDURE — 3700000000 HC ANESTHESIA ATTENDED CARE: Performed by: ORTHOPAEDIC SURGERY

## 2022-04-18 DEVICE — GENESIS II NON-POROUS TIBIAL                                    BASEPLATE SIZE 2 LEFT
Type: IMPLANTABLE DEVICE | Site: KNEE | Status: FUNCTIONAL
Brand: GENESIS II

## 2022-04-18 DEVICE — GEN II 7.5MM RESUR PAT 26MM
Type: IMPLANTABLE DEVICE | Site: PATELLA | Status: FUNCTIONAL
Brand: GENESIS II

## 2022-04-18 DEVICE — KNEE K1 TOT HEMI STD CEM IMPL CAPPED K1 SN: Type: IMPLANTABLE DEVICE | Status: FUNCTIONAL

## 2022-04-18 DEVICE — FULL DOSE BONE CEMENT, 10 PACK CATALOG NUMBER IS 6191-1-010
Type: IMPLANTABLE DEVICE | Site: KNEE | Status: FUNCTIONAL
Brand: SIMPLEX

## 2022-04-18 DEVICE — LEGION PS HIGH FLEX XLPE SZ 1-2 10MM
Type: IMPLANTABLE DEVICE | Site: KNEE | Status: FUNCTIONAL
Brand: LEGION

## 2022-04-18 DEVICE — LEGION NARROW POSTERIOR STABILIZED                                    OXINIUM SIZE 3N LEFT
Type: IMPLANTABLE DEVICE | Site: KNEE | Status: FUNCTIONAL
Brand: LEGION

## 2022-04-18 RX ORDER — ALOGLIPTIN 12.5 MG/1
12.5 TABLET, FILM COATED ORAL DAILY
Status: CANCELLED | OUTPATIENT
Start: 2022-04-18

## 2022-04-18 RX ORDER — ATORVASTATIN CALCIUM 40 MG/1
40 TABLET, FILM COATED ORAL DAILY
Status: CANCELLED | OUTPATIENT
Start: 2022-04-18

## 2022-04-18 RX ORDER — PIOGLITAZONEHYDROCHLORIDE 45 MG/1
45 TABLET ORAL DAILY
Status: CANCELLED | OUTPATIENT
Start: 2022-04-18

## 2022-04-18 RX ORDER — ACETAMINOPHEN 500 MG
1000 TABLET ORAL ONCE
Status: COMPLETED | OUTPATIENT
Start: 2022-04-18 | End: 2022-04-18

## 2022-04-18 RX ORDER — SCOLOPAMINE TRANSDERMAL SYSTEM 1 MG/1
1 PATCH, EXTENDED RELEASE TRANSDERMAL
Status: DISCONTINUED | OUTPATIENT
Start: 2022-04-18 | End: 2022-04-18 | Stop reason: HOSPADM

## 2022-04-18 RX ORDER — DIPHENHYDRAMINE HYDROCHLORIDE 50 MG/ML
12.5 INJECTION INTRAMUSCULAR; INTRAVENOUS
Status: DISCONTINUED | OUTPATIENT
Start: 2022-04-18 | End: 2022-04-18 | Stop reason: HOSPADM

## 2022-04-18 RX ORDER — ONDANSETRON 2 MG/ML
4 INJECTION INTRAMUSCULAR; INTRAVENOUS EVERY 6 HOURS PRN
Status: CANCELLED | OUTPATIENT
Start: 2022-04-18

## 2022-04-18 RX ORDER — DEXTROSE AND SODIUM CHLORIDE 5; .45 G/100ML; G/100ML
INJECTION, SOLUTION INTRAVENOUS CONTINUOUS
Status: CANCELLED | OUTPATIENT
Start: 2022-04-18

## 2022-04-18 RX ORDER — MELOXICAM 7.5 MG/1
3.75 TABLET ORAL DAILY
Status: DISCONTINUED | OUTPATIENT
Start: 2022-04-18 | End: 2022-04-18 | Stop reason: HOSPADM

## 2022-04-18 RX ORDER — ROPIVACAINE HYDROCHLORIDE 5 MG/ML
30 INJECTION, SOLUTION EPIDURAL; INFILTRATION; PERINEURAL ONCE
Status: COMPLETED | OUTPATIENT
Start: 2022-04-18 | End: 2022-04-18

## 2022-04-18 RX ORDER — GABAPENTIN 100 MG/1
100 CAPSULE ORAL 3 TIMES DAILY
Qty: 90 CAPSULE | Refills: 0 | Status: SHIPPED | OUTPATIENT
Start: 2022-04-18 | End: 2022-05-18

## 2022-04-18 RX ORDER — FENTANYL CITRATE 50 UG/ML
INJECTION, SOLUTION INTRAMUSCULAR; INTRAVENOUS
Status: COMPLETED
Start: 2022-04-18 | End: 2022-04-18

## 2022-04-18 RX ORDER — CEFAZOLIN SODIUM 2 G/50ML
2000 SOLUTION INTRAVENOUS EVERY 8 HOURS
Status: DISCONTINUED | OUTPATIENT
Start: 2022-04-18 | End: 2022-04-18 | Stop reason: HOSPADM

## 2022-04-18 RX ORDER — LABETALOL HYDROCHLORIDE 5 MG/ML
10 INJECTION, SOLUTION INTRAVENOUS
Status: DISCONTINUED | OUTPATIENT
Start: 2022-04-18 | End: 2022-04-18 | Stop reason: HOSPADM

## 2022-04-18 RX ORDER — FENTANYL CITRATE 50 UG/ML
50 INJECTION, SOLUTION INTRAMUSCULAR; INTRAVENOUS ONCE
Status: COMPLETED | OUTPATIENT
Start: 2022-04-18 | End: 2022-04-18

## 2022-04-18 RX ORDER — CELECOXIB 200 MG/1
200 CAPSULE ORAL DAILY
Qty: 60 CAPSULE | Refills: 3 | Status: SHIPPED | OUTPATIENT
Start: 2022-04-18

## 2022-04-18 RX ORDER — SODIUM CHLORIDE 0.9 % (FLUSH) 0.9 %
5-40 SYRINGE (ML) INJECTION PRN
Status: CANCELLED | OUTPATIENT
Start: 2022-04-18

## 2022-04-18 RX ORDER — SODIUM CHLORIDE 0.9 % (FLUSH) 0.9 %
5-40 SYRINGE (ML) INJECTION EVERY 12 HOURS SCHEDULED
Status: CANCELLED | OUTPATIENT
Start: 2022-04-18

## 2022-04-18 RX ORDER — CEFAZOLIN SODIUM 2 G/50ML
2000 SOLUTION INTRAVENOUS
Status: COMPLETED | OUTPATIENT
Start: 2022-04-18 | End: 2022-04-18

## 2022-04-18 RX ORDER — SODIUM CHLORIDE 9 MG/ML
25 INJECTION, SOLUTION INTRAVENOUS PRN
Status: CANCELLED | OUTPATIENT
Start: 2022-04-18

## 2022-04-18 RX ORDER — ASPIRIN 325 MG
325 TABLET, DELAYED RELEASE (ENTERIC COATED) ORAL 2 TIMES DAILY
Qty: 60 TABLET | Refills: 0 | Status: SHIPPED | OUTPATIENT
Start: 2022-04-18 | End: 2022-05-18

## 2022-04-18 RX ORDER — FENTANYL CITRATE 50 UG/ML
INJECTION, SOLUTION INTRAMUSCULAR; INTRAVENOUS PRN
Status: DISCONTINUED | OUTPATIENT
Start: 2022-04-18 | End: 2022-04-18 | Stop reason: SDUPTHER

## 2022-04-18 RX ORDER — MORPHINE SULFATE 2 MG/ML
2 INJECTION, SOLUTION INTRAMUSCULAR; INTRAVENOUS
Status: CANCELLED | OUTPATIENT
Start: 2022-04-18

## 2022-04-18 RX ORDER — ROPIVACAINE HYDROCHLORIDE 5 MG/ML
INJECTION, SOLUTION EPIDURAL; INFILTRATION; PERINEURAL
Status: COMPLETED
Start: 2022-04-18 | End: 2022-04-18

## 2022-04-18 RX ORDER — METOPROLOL SUCCINATE 50 MG/1
50 TABLET, EXTENDED RELEASE ORAL DAILY
Status: CANCELLED | OUTPATIENT
Start: 2022-04-18

## 2022-04-18 RX ORDER — MIDAZOLAM HYDROCHLORIDE 1 MG/ML
INJECTION INTRAMUSCULAR; INTRAVENOUS
Status: COMPLETED
Start: 2022-04-18 | End: 2022-04-18

## 2022-04-18 RX ORDER — ONDANSETRON 2 MG/ML
4 INJECTION INTRAMUSCULAR; INTRAVENOUS
Status: DISCONTINUED | OUTPATIENT
Start: 2022-04-18 | End: 2022-04-18 | Stop reason: HOSPADM

## 2022-04-18 RX ORDER — MORPHINE SULFATE 2 MG/ML
2 INJECTION, SOLUTION INTRAMUSCULAR; INTRAVENOUS EVERY 5 MIN PRN
Status: DISCONTINUED | OUTPATIENT
Start: 2022-04-18 | End: 2022-04-18 | Stop reason: HOSPADM

## 2022-04-18 RX ORDER — CEFAZOLIN SODIUM 2 G/50ML
SOLUTION INTRAVENOUS
Status: COMPLETED
Start: 2022-04-18 | End: 2022-04-18

## 2022-04-18 RX ORDER — SODIUM CHLORIDE 9 MG/ML
25 INJECTION, SOLUTION INTRAVENOUS PRN
Status: DISCONTINUED | OUTPATIENT
Start: 2022-04-18 | End: 2022-04-18 | Stop reason: HOSPADM

## 2022-04-18 RX ORDER — PROPOFOL 10 MG/ML
INJECTION, EMULSION INTRAVENOUS CONTINUOUS PRN
Status: DISCONTINUED | OUTPATIENT
Start: 2022-04-18 | End: 2022-04-18 | Stop reason: SDUPTHER

## 2022-04-18 RX ORDER — OXYCODONE HYDROCHLORIDE 5 MG/1
10 TABLET ORAL EVERY 4 HOURS PRN
Status: DISCONTINUED | OUTPATIENT
Start: 2022-04-18 | End: 2022-04-18 | Stop reason: HOSPADM

## 2022-04-18 RX ORDER — DEXAMETHASONE SODIUM PHOSPHATE 10 MG/ML
8 INJECTION INTRAMUSCULAR; INTRAVENOUS ONCE
Status: COMPLETED | OUTPATIENT
Start: 2022-04-18 | End: 2022-04-18

## 2022-04-18 RX ORDER — ONDANSETRON 4 MG/1
4 TABLET, ORALLY DISINTEGRATING ORAL EVERY 8 HOURS PRN
Status: CANCELLED | OUTPATIENT
Start: 2022-04-18

## 2022-04-18 RX ORDER — MIDAZOLAM HYDROCHLORIDE 1 MG/ML
1 INJECTION INTRAMUSCULAR; INTRAVENOUS
Status: COMPLETED | OUTPATIENT
Start: 2022-04-18 | End: 2022-04-18

## 2022-04-18 RX ORDER — LISINOPRIL 20 MG/1
40 TABLET ORAL DAILY
Status: CANCELLED | OUTPATIENT
Start: 2022-04-18

## 2022-04-18 RX ORDER — HYDRALAZINE HYDROCHLORIDE 20 MG/ML
10 INJECTION INTRAMUSCULAR; INTRAVENOUS
Status: DISCONTINUED | OUTPATIENT
Start: 2022-04-18 | End: 2022-04-18 | Stop reason: HOSPADM

## 2022-04-18 RX ORDER — OXYCODONE HYDROCHLORIDE AND ACETAMINOPHEN 5; 325 MG/1; MG/1
1 TABLET ORAL EVERY 6 HOURS PRN
Qty: 28 TABLET | Refills: 0 | Status: SHIPPED | OUTPATIENT
Start: 2022-04-18 | End: 2022-04-25

## 2022-04-18 RX ORDER — SODIUM CHLORIDE 9 MG/ML
INJECTION, SOLUTION INTRAVENOUS CONTINUOUS PRN
Status: DISCONTINUED | OUTPATIENT
Start: 2022-04-18 | End: 2022-04-18 | Stop reason: SDUPTHER

## 2022-04-18 RX ORDER — ROPIVACAINE HYDROCHLORIDE 5 MG/ML
INJECTION, SOLUTION EPIDURAL; INFILTRATION; PERINEURAL
Status: COMPLETED | OUTPATIENT
Start: 2022-04-18 | End: 2022-04-18

## 2022-04-18 RX ORDER — SODIUM CHLORIDE, SODIUM LACTATE, POTASSIUM CHLORIDE, CALCIUM CHLORIDE 600; 310; 30; 20 MG/100ML; MG/100ML; MG/100ML; MG/100ML
INJECTION, SOLUTION INTRAVENOUS CONTINUOUS PRN
Status: DISCONTINUED | OUTPATIENT
Start: 2022-04-18 | End: 2022-04-18 | Stop reason: SDUPTHER

## 2022-04-18 RX ORDER — VANCOMYCIN HYDROCHLORIDE 1 G/20ML
INJECTION, POWDER, LYOPHILIZED, FOR SOLUTION INTRAVENOUS PRN
Status: DISCONTINUED | OUTPATIENT
Start: 2022-04-18 | End: 2022-04-18 | Stop reason: ALTCHOICE

## 2022-04-18 RX ORDER — MEPERIDINE HYDROCHLORIDE 25 MG/ML
12.5 INJECTION INTRAMUSCULAR; INTRAVENOUS; SUBCUTANEOUS EVERY 5 MIN PRN
Status: DISCONTINUED | OUTPATIENT
Start: 2022-04-18 | End: 2022-04-18 | Stop reason: HOSPADM

## 2022-04-18 RX ORDER — CELECOXIB 100 MG/1
100 CAPSULE ORAL ONCE
Status: COMPLETED | OUTPATIENT
Start: 2022-04-18 | End: 2022-04-18

## 2022-04-18 RX ORDER — SODIUM CHLORIDE 9 MG/ML
INJECTION, SOLUTION INTRAVENOUS CONTINUOUS
Status: DISCONTINUED | OUTPATIENT
Start: 2022-04-18 | End: 2022-04-18 | Stop reason: HOSPADM

## 2022-04-18 RX ORDER — BUPIVACAINE HYDROCHLORIDE 7.5 MG/ML
INJECTION, SOLUTION INTRASPINAL PRN
Status: DISCONTINUED | OUTPATIENT
Start: 2022-04-18 | End: 2022-04-18 | Stop reason: SDUPTHER

## 2022-04-18 RX ORDER — SODIUM CHLORIDE 0.9 % (FLUSH) 0.9 %
5-40 SYRINGE (ML) INJECTION EVERY 12 HOURS SCHEDULED
Status: DISCONTINUED | OUTPATIENT
Start: 2022-04-18 | End: 2022-04-18 | Stop reason: HOSPADM

## 2022-04-18 RX ORDER — ACETAMINOPHEN 325 MG/1
650 TABLET ORAL EVERY 6 HOURS
Status: DISCONTINUED | OUTPATIENT
Start: 2022-04-19 | End: 2022-04-18 | Stop reason: HOSPADM

## 2022-04-18 RX ORDER — SODIUM CHLORIDE 0.9 % (FLUSH) 0.9 %
5-40 SYRINGE (ML) INJECTION PRN
Status: DISCONTINUED | OUTPATIENT
Start: 2022-04-18 | End: 2022-04-18 | Stop reason: HOSPADM

## 2022-04-18 RX ADMIN — ROPIVACAINE HYDROCHLORIDE 30 ML: 5 INJECTION, SOLUTION EPIDURAL; INFILTRATION; PERINEURAL at 06:49

## 2022-04-18 RX ADMIN — PROPOFOL INJECTABLE EMULSION 65 MCG/KG/MIN: 10 INJECTION, EMULSION INTRAVENOUS at 07:04

## 2022-04-18 RX ADMIN — DEXAMETHASONE SODIUM PHOSPHATE 8 MG: 10 INJECTION INTRAMUSCULAR; INTRAVENOUS at 05:34

## 2022-04-18 RX ADMIN — MIDAZOLAM 1 MG: 1 INJECTION INTRAMUSCULAR; INTRAVENOUS at 06:42

## 2022-04-18 RX ADMIN — SODIUM CHLORIDE: 9 INJECTION, SOLUTION INTRAVENOUS at 06:56

## 2022-04-18 RX ADMIN — FENTANYL CITRATE 25 MCG: 50 INJECTION, SOLUTION INTRAMUSCULAR; INTRAVENOUS at 07:00

## 2022-04-18 RX ADMIN — SODIUM CHLORIDE: 9 INJECTION, SOLUTION INTRAVENOUS at 05:33

## 2022-04-18 RX ADMIN — MIDAZOLAM 1 MG: 1 INJECTION INTRAMUSCULAR; INTRAVENOUS at 06:40

## 2022-04-18 RX ADMIN — CEFAZOLIN SODIUM 2000 MG: 2 SOLUTION INTRAVENOUS at 09:45

## 2022-04-18 RX ADMIN — CEFAZOLIN SODIUM 2000 MG: 2 SOLUTION INTRAVENOUS at 06:56

## 2022-04-18 RX ADMIN — SODIUM CHLORIDE, POTASSIUM CHLORIDE, SODIUM LACTATE AND CALCIUM CHLORIDE: 600; 310; 30; 20 INJECTION, SOLUTION INTRAVENOUS at 07:51

## 2022-04-18 RX ADMIN — FENTANYL CITRATE 25 MCG: 50 INJECTION, SOLUTION INTRAMUSCULAR; INTRAVENOUS at 07:02

## 2022-04-18 RX ADMIN — FENTANYL CITRATE 50 MCG: 50 INJECTION, SOLUTION INTRAMUSCULAR; INTRAVENOUS at 06:40

## 2022-04-18 RX ADMIN — BUPIVACAINE HYDROCHLORIDE IN DEXTROSE 1.6 ML: 7.5 INJECTION, SOLUTION SUBARACHNOID at 07:02

## 2022-04-18 RX ADMIN — FENTANYL CITRATE 50 MCG: 50 INJECTION, SOLUTION INTRAMUSCULAR; INTRAVENOUS at 06:58

## 2022-04-18 RX ADMIN — PHENYLEPHRINE HYDROCHLORIDE 100 MCG: 10 INJECTION INTRAVENOUS at 07:12

## 2022-04-18 RX ADMIN — MELOXICAM 3.75 MG: 7.5 TABLET ORAL at 13:06

## 2022-04-18 RX ADMIN — CELECOXIB 100 MG: 100 CAPSULE ORAL at 05:33

## 2022-04-18 RX ADMIN — ACETAMINOPHEN 1000 MG: 500 TABLET ORAL at 05:33

## 2022-04-18 ASSESSMENT — PAIN DESCRIPTION - ORIENTATION
ORIENTATION: LEFT
ORIENTATION: LEFT;ANTERIOR

## 2022-04-18 ASSESSMENT — PULMONARY FUNCTION TESTS
PIF_VALUE: 1
PIF_VALUE: 0
PIF_VALUE: 1
PIF_VALUE: 0
PIF_VALUE: 0
PIF_VALUE: 1
PIF_VALUE: 0
PIF_VALUE: 1
PIF_VALUE: 1
PIF_VALUE: 0
PIF_VALUE: 0
PIF_VALUE: 1
PIF_VALUE: 0
PIF_VALUE: 1
PIF_VALUE: 0
PIF_VALUE: 1
PIF_VALUE: 2
PIF_VALUE: 1
PIF_VALUE: 0
PIF_VALUE: 1
PIF_VALUE: 0
PIF_VALUE: 1
PIF_VALUE: 0
PIF_VALUE: 1
PIF_VALUE: 0
PIF_VALUE: 1
PIF_VALUE: 0
PIF_VALUE: 1
PIF_VALUE: 0
PIF_VALUE: 1
PIF_VALUE: 0
PIF_VALUE: 1
PIF_VALUE: 0
PIF_VALUE: 0
PIF_VALUE: 1

## 2022-04-18 ASSESSMENT — PAIN DESCRIPTION - PAIN TYPE
TYPE: SURGICAL PAIN

## 2022-04-18 ASSESSMENT — PAIN DESCRIPTION - LOCATION
LOCATION: KNEE

## 2022-04-18 ASSESSMENT — PAIN DESCRIPTION - PROGRESSION
CLINICAL_PROGRESSION: GRADUALLY WORSENING
CLINICAL_PROGRESSION: GRADUALLY WORSENING
CLINICAL_PROGRESSION: RESOLVED

## 2022-04-18 ASSESSMENT — PAIN DESCRIPTION - DESCRIPTORS
DESCRIPTORS: DISCOMFORT
DESCRIPTORS: BURNING
DESCRIPTORS: BURNING;ACHING

## 2022-04-18 ASSESSMENT — PAIN SCALES - GENERAL
PAINLEVEL_OUTOF10: 0
PAINLEVEL_OUTOF10: 1
PAINLEVEL_OUTOF10: 1
PAINLEVEL_OUTOF10: 0
PAINLEVEL_OUTOF10: 3
PAINLEVEL_OUTOF10: 1
PAINLEVEL_OUTOF10: 2
PAINLEVEL_OUTOF10: 0

## 2022-04-18 ASSESSMENT — PAIN DESCRIPTION - FREQUENCY
FREQUENCY: CONTINUOUS
FREQUENCY: CONTINUOUS
FREQUENCY: INTERMITTENT
FREQUENCY: INTERMITTENT
FREQUENCY: CONTINUOUS

## 2022-04-18 ASSESSMENT — PAIN DESCRIPTION - ONSET
ONSET: PROGRESSIVE
ONSET: ON-GOING
ONSET: GRADUAL

## 2022-04-18 ASSESSMENT — LIFESTYLE VARIABLES: SMOKING_STATUS: 1

## 2022-04-18 ASSESSMENT — PAIN - FUNCTIONAL ASSESSMENT: PAIN_FUNCTIONAL_ASSESSMENT: 0-10

## 2022-04-18 NOTE — H&P
Updated H&P        Chief Complaint   Patient presents with    Knee Pain       left knee TKA DOS 21 still the same          Elmirajose eduardo Boudreaux returns today for follow-up of her left knee pain. she reports this is worse than when I saw her last.  The patient's pain level is a 7/10. The previous treatment was not successful.     Past Medical History        Past Medical History:   Diagnosis Date    Arthritis      Diabetes mellitus ([address]      Hernia of unspecified site of abdominal cavity without mention of obstruction or gangrene      Hyperlipidemia      Hypertension      Reflux           Past Surgical History         Past Surgical History:   Procedure Laterality Date     SECTION       COLONOSCOPY        ENDOSCOPY, COLON, DIAGNOSTIC        KNEE ARTHROSCOPY Left 2021     LEFT KNEE ARTHROSCOPY, MEDIAL MENISECTOMY AND DEBRIDEMENT performed by Carla Schmid DO at [address] [place] [address]           Current Medication      Current Outpatient Medications:     JANUVIA 100 MG tablet, take 1 tablet by mouth once daily, Disp: , Rfl:     metoprolol succinate (TOPROL XL) 50 MG extended release tablet, take 1 AND 1/2 tablets by mouth once daily, Disp: , Rfl:     TRULICITY 4.5 GX/8.0EA SOPN, inject 0.5 milliliter subcutaneously every week, Disp: , Rfl:     atorvastatin (LIPITOR) 40 MG tablet, take 1 tablet by mouth once daily, Disp: , Rfl:     FARXIGA 10 MG tablet, take 1 tablet by mouth once daily, Disp: , Rfl:     TRULICITY 1.5 FK/7.4ZL SOPN, , Disp: , Rfl:     lisinopril (PRINIVIL;ZESTRIL) 40 MG tablet, am, Disp: , Rfl:     pioglitazone (ACTOS) 45 MG tablet, take 1 tablet by mouth once daily, Disp: , Rfl:      No Known Allergies  Social History               Socioeconomic History    Marital status:         Spouse name: Not on file    Number of children: Not on file    Years of education: Not on file    Highest education level: Not on file   Occupational History    Not on file Tobacco Use    Smoking status: Light Tobacco Smoker       Packs/day: 0.25       Years: 40.00       Pack years: 10.00       Types: Cigarettes    Smokeless tobacco: Never Used   Substance and Sexual Activity    Alcohol use: No    Drug use: No    Sexual activity: Not on file   Other Topics Concern    Not on file   Social History Narrative    Not on file      Social Determinants of Health          Financial Resource Strain:     Difficulty of Paying Living Expenses: Not on file   Food Insecurity:     Worried About Running Out of Food in the Last Year: Not on file    Benjamin of Food in the Last Year: Not on file   Transportation Needs:     Lack of Transportation (Medical): Not on file    Lack of Transportation (Non-Medical): Not on file   Physical Activity:     Days of Exercise per Week: Not on file    Minutes of Exercise per Session: Not on file   Stress:     Feeling of Stress : Not on file   Social Connections:     Frequency of Communication with Friends and Family: Not on file    Frequency of Social Gatherings with Friends and Family: Not on file    Attends Temple Services: Not on file    Active Member of [address] [place] [address] or Organizations: Not on file    Attends Club or Organization Meetings: Not on file    Marital Status: Not on file   Intimate Partner Violence:     Fear of Current or Ex-Partner: Not on file    Emotionally Abused: Not on file    Physically Abused: Not on file    Sexually Abused: Not on file   Housing Stability:     Unable to Pay for Housing in the Last Year: Not on file    Number of [place] in the Last Year: Not on file    Unstable Housing in the Last Year: Not on file         Family History   No family history on file.        Review of Systems:      Skin: (-) rash,(-) psoriasis,(-) eczema, (-)skin cancer.    Musculoskeletal: (-) fractures,  (-) dislocations,(-) collagen vascular disease, (-) fibromyalgia, (-) multiple sclerosis, (-) muscular dystrophy, (-) RSD,(-) joint pain (-)swelling, (-) joint pain,swelling. Neurologic: (-) epilepsy, (-)seizures,(-) brain tumor,(-) TIA, (-)stroke, (-)headaches, (-)Parkinson disease,(-) memory loss, (-) LOC. Cardiovascular: (-) Chest pain, (-) swelling in legs/feet, (-) SOB, (-) cramping in legs/feet with walking. _BP 138/63   Pulse 87   Temp 97.3 °F (36.3 °C) (Infrared)   Resp 18   Ht 5' 1.5\" (1.562 m)   Wt 176 lb (79.8 kg)   SpO2 98%   BMI 32.72 kg/m²  Vital signs are stable. In general, patient is awake, alert and oriented X3, in no apparent distress. Examination of HENT reveals normocephalic, atraumatic. PERRLA/EOMI sclera are white. Conjunctivae are clear. TM's are intact. Pharynx is pink and moist.  Uvula and tongue are midline. Heart: Positive S1 and positive S2 with regular rate and rhythm. Lungs: Clear to auscultation bilaterally without rales, rhonchi or wheezes. Abdomen: soft, nontender. Positive bowel sounds. No organomegaly. No guarding or rigidity.       Constitutional:  The patient is alert and oriented x 3, appears to be stated age and in no distress. /63   Pulse 87   Temp 97.3 °F (36.3 °C) (Infrared)   Resp 18   Ht 5' 1.5\" (1.562 m)   Wt 176 lb (79.8 kg)   SpO2 98%   BMI 32.72 kg/m²      Skin:  Upon inspection: the skin appears warm, dry and intact. There is not a previous scar over the affected area. There is not any cellulitis, lymphedema or cutaneous lesions noted in the lower extremities. Upon palpation there is no induration noted.       Neurologic:  Gait: normal;  Motor exam of the lower extremities show ; quadriceps, hamstrings, foot dorsi and plantar flexors intact R.  5/5 and L. 5/5. Deep tendon reflexes are 2/4 at the knees and 2/4 at the ankles with strong extensor hallicus longus motor strength bilaterally. Sensory to both feet is intact to all sensory roots.     Cardiovascular: The vascular exam is normal and is well perfused to distal extremities.   Distal pulses DP/PT: R. 2+; L. 2+.  There is cap refill noted less than two seconds in all digits. There is not edema of the bilateral lower extremities. There is not varicosities noted in the distal extremities.       Lymph:  Upon palpation,  there is no lymphadenopathy noted in bilateral lower extremities.       Musculoskeletal:  Gait: antalgic; examination of the nails and digits reveal no cyanosis or clubbing     Lumbar exam:  On visual inspection, there is no deformity of the spine. full range of motion, no tenderness, palpable spasm or pain on motion. Special tests: Straight Leg Raise negative, Preston testnegative.     Hip exam:  Upon inspection, there is no deformity noted. Upon palpation there is not tenderness. ROM: is   full and semetrical.   Strength: Hip Flexors 5/5; Hip Abductors 5/5; Hip Adduction 5/5.      Knee exam:  Left knee exam shows;  range of motion of R. Knee is 0 to 110, and L. Knee is 0 to 110. She does have  pain on motion, effusion is mild, there is tenderness over the  medial region, there are not any masses, there is not ligamentous instability, there is  deformity noted. Knee exam: bilateral positive for moderate crepitations, some mild tenderness laxity is not noted with  stress.       R. Knee:  Lachman's negative, Anterior Drawer negative, Posterior Drawer negative  Marjorie's negative, Thallasy  negative,   PF grind test negative, Apprehension test negative, Patellar J sign  negative  L. Knee:  Lachman's negative, Anterior Drawer negative, Posterior Drawer negative  Marjorie's positive, Thallasy  positive,   PF grind test positive, Apprehension test negative,  Patellar J sign  negative     Xrays:   Severe medial joint narrowing     MRI:   n/a  Radiographic findings reviewed with patient     Impression:       Encounter Diagnoses   Name Primary?  Primary osteoarthritis of right knee Yes    Primary osteoarthritis of left knee           Plan:   Natural history and expected course discussed.  Questions answered. Educational materials distributed. Rest, ice, compression, and elevation (RICE) therapy. Reduction in offending activity. Patellar compression sleeve.      We discussed various treatment options both surgical and non-surgical.   The patient is unable to ambulate more than 100 feet and is unable to perform the average daily activities including:  Light housework, ADLs, donning clothes, toileting and exercise. Patient has failed previous conservative measures including cortisone injections, NSAIDs, PT, HEP and pain medication and is currently a fall risk to the disability and decreased functioning. The patient wishes to have the total knee arthroplasty. The risks and benefits of a total knee replacement were discussed with the patient. The risks include but are not limited to: infection, injury to blood vessels and nerves, non relief of symptoms, arthrofibrosis of knee, aseptic loosening of prosthesis, intraoperative fracture, blood loss, PE/DVT, MI, dislocation of hip and knee, need for further operative intervention and death. The patient is aware of the risks and wished to proceed with a Left total knee replacement 4/18/22. The patient was counseled at length about the risks of baldo Covid-19 during their perioperative period and any recovery window from their procedure.  The patient was made aware that baldo Covid-19  may worsen their prognosis for recovering from their procedure  and lend to a higher morbidity and/or mortality risk.  All material risks, benefits, and reasonable alternatives including postponing the procedure were discussed.  The patient does wish to proceed with the procedure at this time.

## 2022-04-18 NOTE — OP NOTE
Operative Note      Patient: Narinder Broderick  YOB: 1955  MRN: [id number]    Date of Procedure: 4/18/2022    Pre-Op Diagnosis: LEFT KNEE DJD    Post-Op Diagnosis: Same       Procedure(s):  LEFT KNEE TOTAL ARTHROPLASTY [facility] & NEPHEW)    Surgeon(s): Nidhi Carcamo DO    Assistant:   Resident: Roseline Larsen DO; Seamus Carpenter DO; Wyatt Flores DO    Anesthesia: Spinal    Estimated Blood Loss (mL): Minimal    Complications: None    Specimens:   ID Type Source Tests Collected by Time Destination   A : Bone left knee Tissue Tissue SURGICAL PATHOLOGY Nidhi Carcamo DO 4/18/2022 0730        Implants:  Implant Name Type Inv. Item Serial No.  Lot No. LRB No. Used Action   CEMENT BNE 20ML 40GM FULL DOSE PMMA W/O ANTIBIO M VISC - [id number]  CEMENT BNE 20ML 40GM FULL DOSE PMMA W/O ANTIBIO M VISC  [employer] [facility]- [id number] Left 1 Implanted   INSERT TIB SZ 1-2 [id number] KNEE POST STBL HI FLX [employer] - [id number]  INSERT TIB SZ 1-2 [id number] KNEE POST STBL HI FLX [facility] ORTHOPAEDICS- [id number] Left 1 Implanted   COMPONENT FEM SZ 3 CLAUDIO LT KNEE OXINIUM POST STBL TRINO [employer] - [id number]  COMPONENT FEM SZ 3 CLAUDIO LT KNEE OXINIUM POST STBL TRINO [facility] ORTHOPAEDICS- [id number] Left 1 Implanted   BASEPLATE TIB SZ 2 [id number] ML64MM THK2. 3MM L KNEE TI NP [employer] - [id number]  BASEPLATE TIB SZ 2 [id number] ML64MM THK2. 3MM L KNEE TI NP [facility] Schuyler Dubin [id number] Left 1 Implanted   COMPONENT PAT AKB25VD THK7.5MM STD TRINO RESURFACE RND NP - [id number]  COMPONENT PAT [id number] THK7.5MM STD TRINO RESURFACE RND NP  [employer] Schuyler Dubin [id number] Left 1 Implanted         Drains: * No LDAs found *    Findings: as above    Detailed Description of Procedure:   below      Department of Orthopedic Surgery  Operative Report        Pre-operative Diagnosis:  Left Knee Osteoarthritis    Post-operative Diagnosis:  Left Knee Osteoarthritis    Procedure:  Left Knee Arthroplasty    Surgeon:  Nidia Craft DO     Assistant(s):  As above    Anesthesia:  Spinal anesthesia    Estimated blood loss:  Minimal    Specimens:  As above    Implants:  As above    Complications:  none    Condition:  Stable    Brief Hospital Course:  Ra Joy is a patient known to Nidia Craft DO's practice with persistent complaints of Left knee pain. Knee pain has failed to be relieved by non-operative conservative measures, and has began affecting daily activities of living. After examination of the patient, review of the radiologic studies, and appropriate pre-operative risk assessment, Nidia Craft DO recommended Left knee arthroplasty, which the patient was agreeable towards. Operative Course: The patient was seen and identified outside the operative suite, in which the operative site was marked as appropriate by patient, surgeon, staff, and anesthesia. The patient was then taken into the operative suite, transferred to the operative table with all bony prominences and neurovascular structures well padded and protected. A tourniquet was placed high on the proximal thigh of the operative extremity. The patient was sedated under the care of the anesthesia team. The operative site was prepped and draped in standard sterile fashion. The tourniquet was inflated to 300 mmHg. An anterior midline incision was made over the knee with full-thickness flaps reflected revealing the anterior joint capsule. Medial parapatellar arthrotomy was performed reflecting the patella laterally. Anterior fat pad and anterior horns of the lateral and medial meniscus were sharply excised. The knee was flexed up. Anterior cruciate ligament and posterior cruciate ligament were then excised. All osteophytes on the distal femur were removed with rongeur. At this point, drilling of the intramedullary canal of the distal femur was then performed.  Distal femoral cutting jig was then placed and pinned in appropriate position. An oscillating saw was used to make the distal femoral cut, discarding the pieces of cut femoral bone and sent for study. The posterior reference guide was then placed on the distal femur after the intramedullary guide and the distal femoral cutting guide were then removed. The posterior referencing guide was then pinned in appropriate position. Elliott wing was used to confirm appropriate femoral trial size according to pre-operative templating. Posterior reference guide was then removed. An appropriate sized 4-in-1 cutting guide was applied to the distal femur. Oscillating saw was used to make the anterior, posterior, and chamfer cuts. The 4-in-1 cutting guide was then removed. Attention was turned to the tibia. Intramedullary drilling was then performed of the proximal tibia. The intramedullary guide with the proximal tibial cutting guide was then placed on the tibia. Proximal tibial cutting guide was then pinned in appropriate position. After pinning the proximal tibial cutting guide in appropriate position, oscillating saw was then used to make the proximal tibial cut. The guide was then removed. The proximal tibia that was cut was sharply excised and removed. At this time, all osteophytes on the proximal tibia were then removed with a rongeur. Tensiometer was then placed in extension and flexion, confirming appropriate ligamentous balancing. The box-cutting guide for the posterior-stabilized knee was then placed on the distal femur. The box was then cut in the distal femur without complication. Box-cutting guide was then removed. An appropriately sized trial tibial baseplate and a polyethylene component trial were then placed into the knee. The appropriately sized femur trial component was then placed. The knee was reduced and taken through a range of motion and found to be appropriately stable.  Half pins were then placed in the tibial base plate confirming position in preparation for terry punch. The patella was then prepared. The patella surface was free hand cut for the appropriate size implant. The patella holes were drilled and the patella was then trialed. There was good alignment and tracking of the patella. Distal femoral component was removed, trial poly was then removed. Keel punch was then performed of the proximal tibial. Tibial base plate was then removed. Copious irrigation was performed of the wound and final inspection for all interposed soft tissue and loose bodies was then performed as cement was being mixed. Copious irrigation was performed once again of the knee. Cement was placed on the proximal tibial component and the tibial component was then impacted into appropriate position with all excess extruded cement being removed with Marble elevator. A polyethylene component was then impacted into appropriate position and checked for stability, which it was stable. Cement was then placed on the distal femoral component. Distal femoral component was then impacted in appropriate position with all excess extruded cement being removed with a Marble elevator. The knee was extended, taken through a range of motion, and found to be appropriately stable. Final inspection for all excess extruded cement was then performed. Marble elevator removed all excess extruded cement. Copious irrigation was performed of the knee. The knee was then soaked with a bedadine solution soak for 3 minutes. The knee was then throughly irrigated with saline solution. Then 1 gram of vanomycin powder was placed within the wound. The capsule was  then closed with strata-fix closure. I then injected our TXA mixture into the knee joint. Copious irrigation was performed of this layer followed by, 2-0 Vicryl for the subcutaneous tissues, and skin staples was used to secure incision. A sterile layered dressing was placed over the wound. Tourniquet was deflated.  The patient was awakened from anesthesia, transferred to the hospital bed, and taken to the PACU in stable condition. Disposition: The patient was taken to PACU in stable condition. Once stable, the patient will be transferred to the floor. Orders have been provided to begin physical therapy, weight bear as tolerated Left lower extremity. Patient received a dose of antibiotics preoperatively. We will continue this for 24 hours postoperatively for infection prophylaxis. The patient will also be started on asa for DVT prophylaxis. We have consulted  and case management for discharge planning and consulted the PCP for medical management.      Electronically signed by Sylvester Villalta DO on 4/18/2022 at 8:46 AM

## 2022-04-18 NOTE — PROGRESS NOTES
CLINICAL PHARMACY NOTE: MEDS TO BEDS    Total # of Prescriptions Filled: 3   The following medications were delivered to the patient:  · Celecoxib 200 mg  · Gabapentin 100 mg   · Oxycodone 5-325 mg    Additional Documentation:  Aspirin not covered patient has some at home

## 2022-04-18 NOTE — PROGRESS NOTES
630 to pacu for left adductor canal block. Connected to all monitors and O2 applied at 3 liters nasal canula. 639 time out performed and premedicated per orders. 644 block started using ultrasound guidance  649 30 ml 0.5% ropivacaine injected to left adductor canal gina procedure well.  Warm blankets applied

## 2022-04-18 NOTE — PROGRESS NOTES
Physical Therapy Initial Evaluation/Plan of Care    Room #:  OR POOL/NONE  Patient Name: Leyda Woodard  YOB: 1955  MRN: [id number]    Date of Service: 2022     Tentative placement recommendation: Home Health PT 5 days a week   Equipment recommendation: Tub transfer bench and wheels for wheeled walker      Evaluating Physical Therapist: Jaime Franz #[id number]     Specific Provider Orders/Date/Referring Provider :  22   PT eval and treat Start: 22, End: 22, ONE TIME, Standing Count: 1 Occurrences, R    Sam Kay DO    Admitting Diagnosis:   Primary osteoarthritis of left knee [M17.12]   Visit diagnosis:   Visit Diagnoses       Codes    S/P total knee arthroplasty, left     T91.254        Surgery:     left Total knee arthroplasty (TKA)    Patient Active Problem List   Diagnosis    Knee pain    Right knee DJD    Primary osteoarthritis of left knee    Acute medial meniscus tear, left, initial encounter       ASSESSMENT of Current Deficits  Safe for discharge home with supervision. PHYSICAL THERAPY  PLAN OF CARE     Discharge skilled Physical Therapy. Patient and or family understand(s) diagnosis, prognosis, and plan of care.     Prior Level of Function: Patient ambulated independently    Rehab Potential: good    for baseline    Past medical history:   Past Medical History:   Diagnosis Date    Arthritis     Diabetes mellitus ([address]     Hernia of unspecified site of abdominal cavity without mention of obstruction or gangrene     Hyperlipidemia     Hypertension     Reflux      Past Surgical History:   Procedure Laterality Date     SECTION      COLONOSCOPY      ENDOSCOPY, COLON, DIAGNOSTIC      KNEE ARTHROSCOPY Left 2021    LEFT KNEE ARTHROSCOPY, MEDIAL MENISECTOMY AND DEBRIDEMENT performed by Sam Kay DO at [address] [place] [address] Right 2022         SUBJECTIVE:    Precautions: ambulate patient, falls ,Left FWB (full weight bearing)      Social history: Patient lives with daughter in a ranch home  with 2+ 1 steps  to enter with Rail  Tub shower      Equipment owned: [id number],   Wheels delivered and placed ou home walker    [id number]   How much difficulty turning over in bed?: None  How much difficulty sitting down on / standing up from a chair with arms?: A Little  How much difficulty moving from lying on back to sitting on side of bed?: A Little  How much help from another person moving to and from a bed to a chair?: A Little  How much help from another person needed to walk in hospital room?: A Little  How much help from another person for climbing 3-5 steps with a railing?: A Little  AM-PAC Inpatient Mobility Raw Score : 19  AM-PAC Inpatient T-Scale Score : 45.44  Mobility Inpatient CMS 0-100% Score: 41.77  Mobility Inpatient CMS G-Code Modifier : CK    Nursing cleared patient for PT evaluation. The admitting diagnosis and active problem list as listed above have been reviewed prior to the initiation of this evaluation. OBJECTIVE:  Was pt agreeable to Eval/treatment?  Yes   Pain Level  3/10   Left knee     Bed Mobility  Rolling: Independent    Supine to sit: Not assessed     Sit to supine: Supervision     Scooting: Supervision      Transfers Sit to stand: Supervision  Cues for hand placement and safety    Ambulation    2 x 90 feet using  wheeled walker with Supervision    Patient with step to gait pattern and cues for sequencing, upright posture, left knee extension in stance phase of gait, safety and pacing   Stair negotiation: ascended and descended   2 x 5 steps bilateral rail first rep, 1 rail and hand held assist 2nd rep supervision     ROM Within functional limits except Left knee 0-90°    Strength Within functional limits  except  Left lower extremity 3/5   Balance Sitting EOB:  good     Dynamic Standing:  good  - wheeled walker      Patient is Alert & Oriented x person, place, time and situation and follows directions    Sensation:  Patient denies numbness/tingling  Edema:  yes left lower extremity   Vitals:  Blood Pressure at rest   Blood Pressure during session     Heart Rate at rest   Heart Rate during session     SPO2 at rest  %  SPO2 during session  %     Patient education  Patient educated on role of Physical Therapy, risks of immobility, safety and plan of care,  importance of mobility while in hospital , ankle pumps, quad set and glut set for edema control, blood clot prevention, safety , stair training , weight bearing status  and left knee extension in bed and during stance phase of gait      Patient response to education:   Pt verbalized understanding Pt demonstrated skill Pt requires further education in this area   Yes Partial Yes       Treatment:  Patient practiced and was instructed in the following treatment:     Therapist educated and facilitated patient on techniques to increase safety and independence with bed mobility, balance, functional transfers, and functional mobility. Assisted to edge of bed,  Sat edge of bed 10 minutes with Supervision  to increase dynamic sitting balance and activity tolerance. . Ambulated in hallway, performed stairs. Returned to bed performed exercises. Patient performed ankle pumps, quad sets, glut sets x 15-20 each. Active assist heelslide, hip abduction/adduction, straight leg raise x 10 each      Instruction knee extension in bed with kneecap and toes pointing to ceiling  Instruction and performance of incentive inspirometer. At end of session, patient in bed with   call light and phone within reach,   all lines and tubes intact, nursing notified. Patient would benefit from skilled Home Physical Therapy to improve functional independence and quality of life.        Patient's/ family goals   home today    Patient and or family understand(s) diagnosis, prognosis, and plan of care.       Time in  1415  Time out  1508    Total Treatment Time  33 minutes    Evaluation time includes thorough review of current medical information, gathering information on past medical history/social history and prior level of function, completion of standardized testing/informal observation of tasks, assessment of data, and development of Plan of care and goals.      CPT codes:  Low Complexity PT evaluation (19364)  Therapeutic activities (14505)   15 minutes  1 unit(s)  Therapeutic exercises (47025)   15 minutes  1 unit(s)  Non billable time 3 minutes    Spencer Lyn, PT

## 2022-04-18 NOTE — ANESTHESIA PROCEDURE NOTES
Peripheral Block    Patient location during procedure: holding area  Start time: 4/18/2022 6:44 AM  End time: 4/18/2022 6:49 AM  Staffing  Performed: anesthesiologist and other anesthesia staff   Anesthesiologist: Hortensia Romero MD  Other anesthesia staff: Arielle Cuello RN  Preanesthetic Checklist  Completed: patient identified, IV checked, site marked, risks and benefits discussed, surgical consent, monitors and equipment checked, pre-op evaluation, timeout performed, anesthesia consent given, oxygen available and patient being monitored  Peripheral Block  Patient position: supine  Prep: ChloraPrep  Patient monitoring: cardiac monitor, continuous pulse ox, frequent blood pressure checks and IV access  Block type: Femoral  Laterality: left  Injection technique: single-shot  Guidance: ultrasound guided  Local infiltration: lidocaine  Infiltration strength: 1 %  Dose: 3 mL  Adductor canal  Provider prep: mask and sterile gloves  Local infiltration: lidocaine  Needle  Needle gauge: 21 G  Needle length: 10 cm  Needle localization: ultrasound guidance  Assessment  Injection assessment: negative aspiration for heme, no paresthesia on injection and local visualized surrounding nerve on ultrasound  Paresthesia pain: none  Slow fractionated injection: yes  Hemodynamics: stable  Additional Notes              Medications Administered  Ropivacaine (NAROPIN) 0.5% injection, 30 mL  Reason for block: post-op pain management

## 2022-04-18 NOTE — ANESTHESIA PRE PROCEDURE
Department of Anesthesiology  Preprocedure Note       Name:  Kristi Gomez   Age:  79 y.o.  :  1955                                          MRN:  [id number]         Date:  2022      Surgeon: Aura Alvarez): Lito Arreola DO    Procedure: Procedure(s):  LEFT KNEE TOTAL ARTHROPLASTY [facility] & NEPHEW)    Medications prior to admission:   Prior to Admission medications    Medication Sig Start Date End Date Taking?  Authorizing Provider   JANUVIA 100 MG tablet take 1 tablet by mouth once daily 22   Historical Provider, MD   metoprolol succinate (TOPROL XL) 50 MG extended release tablet take 1 AND 1/2 tablets by mouth once daily 22   Historical Provider, MD   TRULICITY 4.5 EU/4.8HV SOPN inject 0.5 milliliter subcutaneously every week 22   Historical Provider, MD   atorvastatin (LIPITOR) 40 MG tablet take 1 tablet by mouth once daily 21   Historical Provider, MD   FARXIGA 10 MG tablet take 1 tablet by mouth once daily 21   Historical Provider, MD   lisinopril (PRINIVIL;ZESTRIL) 40 MG tablet am 21   Historical Provider, MD   pioglitazone (ACTOS) 45 MG tablet take 1 tablet by mouth once daily 21   Historical Provider, MD       Current medications:    Current Facility-Administered Medications   Medication Dose Route Frequency Provider Last Rate Last Admin    fentaNYL (SUBLIMAZE) 100 MCG/2ML injection             midazolam (VERSED) 2 MG/2ML injection             ropivacaine (NAROPIN) 0.5% injection             ortho mix injection   Injection On Call LISA Garrison CNP        sodium chloride flush 0.9 % injection 5-40 mL  5-40 mL IntraVENous 2 times per day LISA Garrison CNP        sodium chloride flush 0.9 % injection 5-40 mL  5-40 mL IntraVENous PRN LISA Garrison CNP        0.9 % sodium chloride infusion  25 mL IntraVENous PRN LISA Garrison CNP        ceFAZolin (ANCEF) 2000 mg in dextrose 3 % 50 mL IVPB (duplex)  2,000 mg IntraVENous On Call to LISA Poole - CNP        scopolamine (TRANSDERM-SCOP) transdermal patch 1 patch  1 patch TransDERmal Q72H Chery Saucdea DO   1 patch at 22 0534    0.9 % sodium chloride infusion   IntraVENous Continuous Arabella Juarez MD 50 mL/hr at 22 0533 New Bag at 22 0533       Allergies:  No Known Allergies    Problem List:    Patient Active Problem List   Diagnosis Code    Knee pain M25.569    Right knee DJD M17.11    Primary osteoarthritis of left knee M17.12    Acute medial meniscus tear, left, initial encounter S80.12A       Past Medical History:        Diagnosis Date    Arthritis     Diabetes mellitus ([facility] [address]     Hernia of unspecified site of abdominal cavity without mention of obstruction or gangrene     Hyperlipidemia     Hypertension     Reflux        Past Surgical History:        Procedure Laterality Date     SECTION      COLONOSCOPY      ENDOSCOPY, COLON, DIAGNOSTIC      KNEE ARTHROSCOPY Left 2021    LEFT KNEE ARTHROSCOPY, MEDIAL MENISECTOMY AND DEBRIDEMENT performed by Chery Sauceda DO at [address] Right 2022       Social History:    Social History     Tobacco Use    Smoking status: Light Tobacco Smoker     Packs/day: 0.25     Years: 40.00     Pack years: 10.00     Types: Cigarettes    Smokeless tobacco: Never Used   Substance Use Topics    Alcohol use: No                                Ready to quit: Not Answered  Counseling given: Not Answered      Vital Signs (Current):   Vitals:    22 0507   BP: 138/63   Pulse: 87   Resp: 18   Temp: 36.3 °C (97.3 °F)   TempSrc: Infrared   SpO2: 98%   Weight: 176 lb (79.8 kg)   Height: 5' 1.5\" (1.562 m)                                              BP Readings from Last 3 Encounters:   22 138/63   22 130/70   21 (!) 86/52       NPO Status: Time of last liquid consumption: 2200>8. H                        Time of last solid consumption: 2200                        Date of last liquid consumption: 04/17/22                        Date of last solid food consumption: 04/17/22    BMI:   Wt Readings from Last 3 Encounters:   04/18/22 176 lb (79.8 kg)   04/13/22 176 lb (79.8 kg)   03/14/22 175 lb (79.4 kg)     Body mass index is 32.72 kg/m². CBC:   Lab Results   Component Value Date    WBC 6.7 04/13/2022    RBC 4.55 04/13/2022    HGB 14.9 04/13/2022    HCT 45.9 04/13/2022    .9 04/13/2022    RDW 14.3 04/13/2022     04/13/2022       CMP:   Lab Results   Component Value Date     04/13/2022    K 4.7 04/13/2022     04/13/2022    CO2 27 04/13/2022    BUN 20 04/13/2022    CREATININE 0.9 04/13/2022    GFRAA >60 04/13/2022    LABGLOM >60 04/13/2022    GLUCOSE 164 04/13/2022    GLUCOSE 151 04/20/2011    PROT 6.9 04/13/2022    CALCIUM 9.4 04/13/2022    BILITOT 0.5 04/13/2022    ALKPHOS 110 04/13/2022    AST 18 04/13/2022    ALT 14 04/13/2022       POC Tests: No results for input(s): POCGLU, POCNA, POCK, POCCL, POCBUN, POCHEMO, POCHCT in the last 72 hours.     Coags:   Lab Results   Component Value Date    PROTIME 11.6 04/13/2022    INR 1.0 04/13/2022    APTT 30.1 04/13/2022       HCG (If Applicable): No results found for: PREGTESTUR, PREGSERUM, HCG, HCGQUANT     ABGs: No results found for: PHART, PO2ART, QJW7VIS, YEW7KCK, BEART, S9IKSXKU     Type & Screen (If Applicable):  No results found for: LABABO, LABRH    Drug/Infectious Status (If Applicable):  No results found for: HIV, HEPCAB    COVID-19 Screening (If Applicable): No results found for: COVID19        Anesthesia Evaluation  Patient summary reviewed and Nursing notes reviewed no history of anesthetic complications:   Airway: Mallampati: III  TM distance: >3 FB   Neck ROM: full  Mouth opening: > = 3 FB Dental: normal exam         Pulmonary: breath sounds clear to auscultation  (+) current smoker (10 pk yrs)          Patient did not smoke on day of surgery. Cardiovascular:  Exercise tolerance: good (>4 METS),   (+) hypertension:, hyperlipidemia      ECG reviewed  Rhythm: regular             Beta Blocker:  Dose within 24 Hrs      ROS comment: EKG=SR   Normal EKG    Cleared by PCP    PE comment: tachycardia   Neuro/Psych:   Negative Neuro/Psych ROS              GI/Hepatic/Renal:   (+) GERD: well controlled,           Endo/Other:    (+) DiabetesType II DM, , : arthritis:., .                 Abdominal:   (+) obese,           Vascular: negative vascular ROS. Other Findings:             Anesthesia Plan      spinal     ASA 2     (Left adductor canal block  Patient agreeable to spinal with GA as backup)  Induction: intravenous. Anesthetic plan and risks discussed with patient. Use of blood products discussed with patient whom consented to blood products. Plan discussed with attending and CRNA. PAT Patient Interview:  Patient seen, chart reviewed per routine on April 18, 2022 at 6:16 AM by Dennis Franks MD.  Above represents information available via shared medical record including previous anesthesia history, drug and allergy history.   Confirmation of above and final plan per Day of Surgery (DOS) anesthesiologist.      Theresa Carlson RN   4/18/2022

## 2022-04-18 NOTE — FLOWSHEET NOTE
04/18/22 1402   Social/Functional History   Lives With Daughter   Type of [address] One level   [address] to enter with rails   Entrance Stairs - Number of Steps 2 steps to enter   Entrance Stairs - Rails Both   Bathroom Shower/Tub Tub/Shower unit   Home Equipment Standard walker   Receives Help From [place]); Family   4/18/2022: SS Note/Discharge plan:  SS Consult noted for post-op d/c planning and for HHC/DME orders noted, pt seen in PAT and post-op in [facility], d/c plan for pt to return home with help from her daughter and her friend, Ines Flowers who will provide her transport home day of surgery from [facility], referrals made to RENATO Bruce for [facility] and to [facility] for walker wheels and a ttb for delivery this after noon between 2-6pm, pt declined wanting or needing a BSC, [facility] notified. Electronically signed by MANAN Mims on 4/18/2022 at 12:20 PM

## 2022-04-18 NOTE — ANESTHESIA POSTPROCEDURE EVALUATION
Department of Anesthesiology  Postprocedure Note    Patient: Tian Cordova  MRN: [id number]  YOB: 1955  Date of evaluation: 4/18/2022  Time:  3:12 PM     Procedure Summary     Date: 04/18/22 Room / Location: [address] [place] [address] / [address] [place] [address]    Anesthesia Start: 6574 Anesthesia Stop: 0883    Procedure: LEFT KNEE TOTAL ARTHROPLASTY [facility]) (Left Knee) Diagnosis: (LEFT KNEE DJD)    Surgeons: Carole Tmaez DO Responsible Provider: Nelly Jennings MD    Anesthesia Type: spinal ASA Status: 2          Anesthesia Type: spinal    Angel Phase I: Angel Score: 10    Angel Phase II: Angel Score: 10    Last vitals: Reviewed and per EMR flowsheets.        Anesthesia Post Evaluation    Patient location during evaluation: PACU  Patient participation: complete - patient participated  Level of consciousness: awake  Airway patency: patent  Nausea & Vomiting: no nausea and no vomiting  Complications: no  Cardiovascular status: hemodynamically stable  Respiratory status: acceptable  Hydration status: euvolemic

## 2022-04-18 NOTE — ANESTHESIA PROCEDURE NOTES
Spinal Block    Patient location during procedure: OR  Reason for block: primary anesthetic and at surgeon's request  Staffing  Performed: other anesthesia staff   Anesthesiologist: Nelly Jennings MD  Resident/CRNA: LISA Chavez - VISHNU  Other anesthesia staff: Teo Riley RN  Preanesthetic Checklist  Completed: patient identified, IV checked, site marked, risks and benefits discussed, surgical consent, monitors and equipment checked, pre-op evaluation, timeout performed, anesthesia consent given, oxygen available and patient being monitored  Spinal Block  Patient position: sitting  Prep: Betadine and site prepped and draped  Patient monitoring: cardiac monitor, continuous pulse ox, continuous capnometry and frequent blood pressure checks  Approach: midline  Location: L2/L3  Provider prep: mask, sterile gloves and sterile gown  Local infiltration: lidocaine  Agent: bupivacaine  Adjuvant: fentanyl  Needle  Needle type: Pencan   Needle gauge: 25 G  Needle length: 3.5 in  Assessment  Sensory level: T6  Swirl obtained: Yes  CSF: clear  Attempts: 1  Hemodynamics: stable

## 2022-04-18 NOTE — PROGRESS NOTES
[address] [facility]  [place]. [place]        Date:2022                                                  Patient Name: Kwesi Rodriguez    MRN: [id number]    : 1955    Room: OR POOL/NONE      Evaluating OT: Ed Rocky OTR/L; [id number]     Referring Provider and Specific Provider Orders/Date:      22  OT eval and treat  Start:  22,   End:  22,   ONE TIME,   Standing Count:  1 Occurrences,   [address] Lopez DO     Placement Recommendation: HHOT       Diagnosis:   1. Primary osteoarthritis of left knee    2.  S/P total knee arthroplasty, left         Surgery: L TKA       Pertinent Medical History:       Past Medical History:   Diagnosis Date    Arthritis     Diabetes mellitus ([facility]     Hernia of unspecified site of abdominal cavity without mention of obstruction or gangrene     Hyperlipidemia     Hypertension     Reflux          Past Surgical History:   Procedure Laterality Date     SECTION      COLONOSCOPY      ENDOSCOPY, COLON, DIAGNOSTIC      KNEE ARTHROSCOPY Left 2021    LEFT KNEE ARTHROSCOPY, MEDIAL MENISECTOMY AND DEBRIDEMENT performed by Marilu Gandhi DO at [address] Right 2022      Precautions:  Fall Risk, full weight bearing: L LE d/t TKA       Assessment of current deficits:    [x] Functional mobility  [x]ADLs  [x] Strength               []Cognition    [x] Functional transfers   [x] IADLs         [] Safety Awareness   [x]Endurance    [] Fine Coordination              [x] Balance      [] Vision/perception   []Sensation     []Gross Motor Coordination  [] ROM  [] Delirium                   [] Motor Control     OT PLAN OF CARE   OT POC based on physician orders, patient diagnosis and results of clinical assessment    Frequency/Duration 1-3 days/wk for 2 weeks PRN     Specific OT Treatment Interventions to include:   * Instruction/training on adapted ADL techniques and AE recommendations to increase functional independence within precautions       * Training on energy conservation strategies, correct breathing pattern and techniques to improve independence/tolerance for self-care routine  * Functional transfer/mobility training/DME recommendations for increased independence, safety, and fall prevention  * Patient/Family education to increase follow through with safety techniques and functional independence  * Recommendation of environmental modifications for increased safety with functional transfers/mobility and ADLs  * Therapeutic exercise to improve motor endurance, ROM, and functional strength for ADLs/functional transfers  * Therapeutic activities to facilitate/challenge dynamic balance, stand tolerance for increased safety and independence with ADLs  * Positioning to improve skin integrity, interaction with environment and functional independence    Recommended Adaptive Equipment: wheels for her standard walker and possibly a tub transfer bench     Home Living: with family : daughter; renting an [employer]: single family home, 1 story, laundry in basement, 2 + 1 step to enter front door with railing, tub shower. Equipment owned: standard walker     Prior Level of Function: Independent with ADLs , Independent with IADLs; ambulated with no device. Driving: yes   Occupation: not working    Pain Level: 3/10 pain in left lateral knee; Nursing notified.       Cognition: A&O: 4/4; Follows 3 step directions   Memory: good    Sequencing: good    Problem solving: good    Judgement/safety: good     [facility]   AM-PAC Daily Activity Inpatient   How much help for putting on and taking off regular lower body clothing?: A Little  How much help for Bathing?: A Little  How much help for Toileting?: A Little  How much help for putting on and taking off regular upper body clothing?: A Little  How much help for taking care of personal grooming?: A Little  How much help for eating meals?: None  AM-PAC Inpatient Daily Activity Raw Score: 19  AM-PAC Inpatient ADL T-Scale Score : 40.22  ADL Inpatient CMS 0-100% Score: 42.8  ADL Inpatient CMS G-Code Modifier : CK     Functional Assessment:    Initial Eval Status  Date: 4/18/22 Treatment Status  Date: STGs = LTGs  Time frame: 10-14 days   Feeding Independent   Independent    Grooming Supervision   Independent    UB Dressing Supervision at EOB to [facility]. Supervision at EOB to don bra and shirt. Independent    LB Dressing Supervision for lower body clothing management before and after toileting. Supervision to doff incontinent garment. Supervision to thread feet through underpants and pants while seated EOB then stood to bring garments up around hips and waist.   Independent    Bathing Minimal Assist   Supervision    Toileting Supervision for hygiene after using commode to urinate, supervision to stand at sink level to wash and dry hands. Independent    Bed Mobility  Supine to sit: Supervision   Sit to supine: N/T as pt was seated EOB   Supine to sit: Independent   Sit to supine: Independent    Functional Transfers Minimal Assist from EOB to wheeled walker upon 1st stand. Supervision for transfer to and from low commode with minimal verbal cues to use grab bar for safe commode transfer. Supervision for transfer to and from edge of bed upon 2nd and 3rd stand while dressing. Transfer training with verbal cues for hand placement throughout session to improve safety. Independent    Functional Mobility Minimal assist with wheeled walker to improve balance to and from bathroom and within the room, verbal cues for walker sequence and safety.    Modified [place]    Balance Sitting:     Static: good     Dynamic: fair   Standing: fair  with wheeled walker     Activity Tolerance fair  good    Visual/  Perceptual Glasses: yes                 Hand Dominance: right      AROM (PROM) Strength Additional Info:    RUE  WFL 5/5 good  and wfl FMC/dexterity noted during ADL tasks     LUE WFL 5/5 good  and wfl FMC/dexterity noted during ADL tasks       Hearing: WFL   Sensation:  No c/o numbness or tingling  Tone: WFL   Edema: yes, L knee    Comments: Upon arrival the patient was supine. At end of session, patient was seated EOB with call light and phone within reach, all lines and tubes intact. Overall patient demonstrated decreased independence and safety during completion of ADL/functional transfer/mobility tasks. Pt would benefit from continued skilled OT to increase safety and independence with completion of ADL/IADL tasks for functional independence and quality of life. Treatment: OT treatment provided this date includes:    Instruction/training on safety and adapted techniques for completion of ADLs    Instruction/training on safe functional mobility/transfer techniques    Instruction/training on energy conservation/work simplification for completion of ADLs    Instruction/training in proper positioning/alignment to prevent contractures   Instruction/training in weight bearing status and walker sequence   Instruction/training in car transfer technique and chair positioning   Instruction/training in lower body dressing techniques   Instruction/training in DME available for home     Rehab Potential: Good for established goals. Patient / Family Goal: return home      Patient and/or family were instructed on functional diagnosis, prognosis/goals and OT plan of care. Demonstrated good understanding.      Eval Complexity: Low    Time In: 1:15pm   Time Out: 2:15pm   Total Treatment Time: 40      Min Units   OT Eval Low 97165  X  1    OT Eval Medium 46277      OT Eval High 10919      OT Re-Eval 77602            ADL/Self Care 37194  25  2    Therapeutic Activities 11826  23  1    Therapeutic Ex 26601       Orthotic Management 97915       Manual 84625     Neuro Re-Ed 07848       Non-Billable Time        Evaluation Time additionally includes thorough review of current medical information, gathering information on past medical history/social history and prior level of function, interpretation of standardized testing/informal observation of tasks, assessment of data and development of plan of care and goals.         Evaluating OT: Talia Babin OTR/L; [id number]

## 2022-04-27 DIAGNOSIS — M25.562 LEFT KNEE PAIN, UNSPECIFIED CHRONICITY: Primary | ICD-10-CM

## 2022-05-02 ENCOUNTER — OFFICE VISIT (OUTPATIENT)
Dept: ORTHOPEDIC SURGERY | Age: 67
End: 2022-05-02

## 2022-05-02 VITALS — HEIGHT: 62 IN | WEIGHT: 176 LBS | BODY MASS INDEX: 32.39 KG/M2

## 2022-05-02 DIAGNOSIS — Z96.652 S/P TOTAL KNEE ARTHROPLASTY, LEFT: Primary | ICD-10-CM

## 2022-05-02 PROCEDURE — 99024 POSTOP FOLLOW-UP VISIT: CPT | Performed by: ORTHOPAEDIC SURGERY

## 2022-05-02 NOTE — PROGRESS NOTES
Subjective:     Post-Operative week: 2 Status Post left Total Knee Arthroplasty, surgery date 4/18/22. Systemic or Specific Complaints:none    Objective:     General: alert, appears stated age and cooperative   Wound: Wound clean and dry no evidence of infection. , No Erythema, No Edema and No Drainage   Motion: Flexion: 0 to 97 Degrees   DVT Exam: No evidence of DVT seen on physical exam.  Negative Niyah's sign. No cords or calf tenderness. No significant calf/ankle edema. Imaging:  Xrays:  No signs of fracture, there is good alignment, and no signs of aseptic loosening. Radiographic findings reviewed with patient    Assessment:     Encounter Diagnosis   Name Primary?  S/P total knee arthroplasty, left Yes      Doing well postoperatively. Plan:     Continues current post-op course, staples were removed at today's appointment  Patient is to continue taking enteric coated aspirin 81 mg, 1 tablet twice daily. Patient is to continue wearing JENNIFER hose, on during the day and off at night. Outpatient physical therapy is to begin immediately. No bathing/swimming/hot tub for two weeks or until incision is completely closed. We will see the patient back in 3 weeks for repeat xray and evaluation.   Please call office with any questions or concerns at [phone]

## 2022-05-24 DIAGNOSIS — Z96.652 S/P TOTAL KNEE ARTHROPLASTY, LEFT: Primary | ICD-10-CM

## 2022-05-25 ENCOUNTER — OFFICE VISIT (OUTPATIENT)
Dept: ORTHOPEDIC SURGERY | Age: 67
End: 2022-05-25

## 2022-05-25 VITALS — HEIGHT: 62 IN | TEMPERATURE: 98 F | BODY MASS INDEX: 32.39 KG/M2 | WEIGHT: 176 LBS

## 2022-05-25 DIAGNOSIS — Z96.652 S/P TOTAL KNEE ARTHROPLASTY, LEFT: Primary | ICD-10-CM

## 2022-05-25 PROCEDURE — 99024 POSTOP FOLLOW-UP VISIT: CPT | Performed by: NURSE PRACTITIONER

## 2022-05-26 NOTE — PROGRESS NOTES
Post-Operative week: 6 Status Post left Total Knee Arthroplasty, DOS: 4/18/22  Systemic or Specific Complaints:No Complaints    Objective:     General: alert, appears stated age and cooperative   Wound: Wound clean and dry no evidence of infection. , No Erythema, No Edema and No Drainage   Motion: Flexion: 0 to 120 Degrees   DVT Exam: No evidence of DVT seen on physical exam.  Negative Niyah's sign. No cords or calf tenderness. No significant calf/ankle edema. Xrays:  No signs of fracture, there is good alignment, and no signs of aseptic loosening. Assessment:     Encounter Diagnosis   Name Primary?  S/P total knee arthroplasty, left Yes      Doing well postoperatively. Plan:     Continues current post-op course  Patient is to discontinue taking enteric coated aspirin 325mg. Patient is to discontinue wearing JENNIFER hose. Continue with outpatient physical therapy.     Follow up 2 months with xr

## 2022-07-20 DIAGNOSIS — Z96.652 S/P TOTAL KNEE ARTHROPLASTY, LEFT: Primary | ICD-10-CM

## 2022-07-25 ENCOUNTER — OFFICE VISIT (OUTPATIENT)
Dept: ORTHOPEDIC SURGERY | Age: 67
End: 2022-07-25
Payer: MEDICARE

## 2022-07-25 VITALS — TEMPERATURE: 98 F | WEIGHT: 175 LBS | BODY MASS INDEX: 32.2 KG/M2 | HEIGHT: 62 IN

## 2022-07-25 DIAGNOSIS — Z96.652 S/P TOTAL KNEE ARTHROPLASTY, LEFT: Primary | ICD-10-CM

## 2022-07-25 DIAGNOSIS — M17.11 PRIMARY OSTEOARTHRITIS OF RIGHT KNEE: ICD-10-CM

## 2022-07-25 PROCEDURE — G8417 CALC BMI ABV UP PARAM F/U: HCPCS | Performed by: ORTHOPAEDIC SURGERY

## 2022-07-25 PROCEDURE — 3017F COLORECTAL CA SCREEN DOC REV: CPT | Performed by: ORTHOPAEDIC SURGERY

## 2022-07-25 PROCEDURE — 1090F PRES/ABSN URINE INCON ASSESS: CPT | Performed by: ORTHOPAEDIC SURGERY

## 2022-07-25 PROCEDURE — 1123F ACP DISCUSS/DSCN MKR DOCD: CPT | Performed by: ORTHOPAEDIC SURGERY

## 2022-07-25 PROCEDURE — G8427 DOCREV CUR MEDS BY ELIG CLIN: HCPCS | Performed by: ORTHOPAEDIC SURGERY

## 2022-07-25 PROCEDURE — G8400 PT W/DXA NO RESULTS DOC: HCPCS | Performed by: ORTHOPAEDIC SURGERY

## 2022-07-25 PROCEDURE — 99213 OFFICE O/P EST LOW 20 MIN: CPT | Performed by: ORTHOPAEDIC SURGERY

## 2022-07-25 PROCEDURE — 20610 DRAIN/INJ JOINT/BURSA W/O US: CPT | Performed by: ORTHOPAEDIC SURGERY

## 2022-07-25 PROCEDURE — 4004F PT TOBACCO SCREEN RCVD TLK: CPT | Performed by: ORTHOPAEDIC SURGERY

## 2022-07-25 RX ORDER — BETAMETHASONE SODIUM PHOSPHATE AND BETAMETHASONE ACETATE 3; 3 MG/ML; MG/ML
6 INJECTION, SUSPENSION INTRA-ARTICULAR; INTRALESIONAL; INTRAMUSCULAR; SOFT TISSUE ONCE
Status: COMPLETED | OUTPATIENT
Start: 2022-07-25 | End: 2022-07-25

## 2022-07-25 RX ADMIN — BETAMETHASONE SODIUM PHOSPHATE AND BETAMETHASONE ACETATE 6 MG: 3; 3 INJECTION, SUSPENSION INTRA-ARTICULAR; INTRALESIONAL; INTRAMUSCULAR; SOFT TISSUE at 11:37

## 2022-07-25 NOTE — PROGRESS NOTES
Chief Complaint   Patient presents with    Knee Pain     Left knee TKA f/u still has a lot of numbness doing well with ROM       Eual Denis returns today for follow-up of her left knee pain. she reports this is better than when I saw her last. She has had increased right knee pain recently. She responds well to csi. Past Medical History:   Diagnosis Date    Arthritis     Diabetes mellitus ([address]     Hernia of unspecified site of abdominal cavity without mention of obstruction or gangrene     Hyperlipidemia     Hypertension     Reflux      Past Surgical History:   Procedure Laterality Date     SECTION      COLONOSCOPY      ENDOSCOPY, COLON, DIAGNOSTIC      KNEE ARTHROSCOPY Left 2021    LEFT KNEE ARTHROSCOPY, MEDIAL MENISECTOMY AND DEBRIDEMENT performed by Juan Miguel Dexter DO at [address] [facility] Right 2022    TOTAL KNEE ARTHROPLASTY Left 2022    LEFT KNEE TOTAL ARTHROPLASTY ([facility]) performed by Juan Miguel Dexter DO at [address]       Current Outpatient Medications:     celecoxib (CELEBREX) 200 MG capsule, Take 1 capsule by mouth daily, Disp: 60 capsule, Rfl: 3    JANUVIA 100 MG tablet, take 1 tablet by mouth once daily, Disp: , Rfl:     metoprolol succinate (TOPROL XL) 50 MG extended release tablet, take 1 AND 1/2 tablets by mouth once daily, Disp: , Rfl:     TRULICITY 4.5 KS/1.1WY SOPN, inject 0.5 milliliter subcutaneously every week, Disp: , Rfl:     atorvastatin (LIPITOR) 40 MG tablet, take 1 tablet by mouth once daily, Disp: , Rfl:     FARXIGA 10 MG tablet, take 1 tablet by mouth once daily, Disp: , Rfl:     lisinopril (PRINIVIL;ZESTRIL) 40 MG tablet, am, Disp: , Rfl:     pioglitazone (ACTOS) 45 MG tablet, take 1 tablet by mouth once daily, Disp: , Rfl:     gabapentin (NEURONTIN) 100 MG capsule, Take 1 capsule by mouth 3 times daily for 30 days.  Intended supply: 30 days, Disp: 90 capsule, Rfl: 0    aspirin 325 MG EC tablet, Take 1 tablet by mouth 2 times daily, Disp: 60 tablet, Rfl: 0  No Known Allergies  Social History     Socioeconomic History    Marital status:      Spouse name: Not on file    Number of children: Not on file    Years of education: Not on file    Highest education level: Not on file   Occupational History    Not on file   Tobacco Use    Smoking status: Light Smoker     Packs/day: 0.25     Years: 40.00     Pack years: 10.00     Types: Cigarettes    Smokeless tobacco: Never   Substance and Sexual Activity    Alcohol use: No    Drug use: No    Sexual activity: Not on file   Other Topics Concern    Not on file   Social History Narrative    Not on file     Social Determinants of Health     Financial Resource Strain: Not on file   Food Insecurity: Not on file   Transportation Needs: Not on file   Physical Activity: Not on file   Stress: Not on file   Social Connections: Not on file   Intimate Partner Violence: Not on file   Housing Stability: Not on file     No family history on file. Review of Systems:     Skin: (-) rash,(-) psoriasis,(-) eczema, (-)skin cancer. Musculoskeletal: (-) fractures,  (-) dislocations,(-) collagen vascular disease, (-) fibromyalgia, (-) multiple sclerosis, (-) muscular dystrophy, (-) RSD,(-) joint pain (-)swelling, (-) joint pain,swelling. Neurologic: (-) epilepsy, (-)seizures,(-) brain tumor,(-) TIA, (-)stroke, (-)headaches, (-)Parkinson disease,(-) memory loss, (-) LOC. Cardiovascular: (-) Chest pain, (-) swelling in legs/feet, (-) SOB, (-) cramping in legs/feet with walking. Constitutional:  The patient is alert and oriented x 3, appears to be stated age and in no distress. Temp 98 °F (36.7 °C)   Ht 5' 1.5\" (1.562 m)   Wt 175 lb (79.4 kg)   BMI 32.53 kg/m²     Skin:  Upon inspection: the skin appears warm, dry and intact. There is not a previous scar over the affected area. There is not any cellulitis, lymphedema or cutaneous lesions noted in the lower extremities.    Upon palpation there is no induration noted. Neurologic:  Gait: normal;  Motor exam of the lower extremities show ; quadriceps, hamstrings, foot dorsi and plantar flexors intact R.  5/5 and L. 5/5. Deep tendon reflexes are 2/4 at the knees and 2/4 at the ankles with strong extensor hallicus longus motor strength bilaterally. Sensory to both feet is intact to all sensory roots. Cardiovascular: The vascular exam is normal and is well perfused to distal extremities. Distal pulses DP/PT: R. 2+; L. 2+. There is cap refill noted less than two seconds in all digits. There is not edema of the bilateral lower extremities. There is not varicosities noted in the distal extremities. Lymph:  Upon palpation,  there is no lymphadenopathy noted in bilateral lower extremities. Musculoskeletal:  Gait: antalgic; examination of the nails and digits reveal no cyanosis or clubbing    Lumbar exam:  On visual inspection, there is no deformity of the spine. full range of motion, no tenderness, palpable spasm or pain on motion. Special tests: Straight Leg Raise negative, Preston testnegative. Hip exam:  Upon inspection, there is no deformity noted. Upon palpation there is not tenderness. ROM: is   full and semetrical.   Strength: Hip Flexors 5/5; Hip Abductors 5/5; Hip Adduction 5/5. Knee exam:  Left knee exam shows;  range of motion of R. Knee is 0 to 125, and L. Knee is 0 to 120. She does have  pain on motion, effusion is none, there is tenderness over the  medial region, there are not any masses, there is not ligamentous instability, there is not  deformity noted. Knee exam: right positive for moderate crepitations, some mild tenderness laxity is not noted with  stress.       R. Knee:  Lachman's negative, Anterior Drawer negative, Posterior Drawer negative  Marjorie's negative, Thallasy  negative,   PF grind test negative, Apprehension test negative, Patellar J sign  negative  L. Knee:  Lachman's negative, Anterior Drawer negative,

## 2022-10-24 DIAGNOSIS — Z96.652 S/P TOTAL KNEE ARTHROPLASTY, LEFT: Primary | ICD-10-CM

## 2022-10-25 ENCOUNTER — OFFICE VISIT (OUTPATIENT)
Dept: ORTHOPEDIC SURGERY | Age: 67
End: 2022-10-25
Payer: MEDICARE

## 2022-10-25 VITALS — TEMPERATURE: 98 F | WEIGHT: 170 LBS | BODY MASS INDEX: 31.28 KG/M2 | HEIGHT: 62 IN

## 2022-10-25 DIAGNOSIS — Z96.652 S/P TOTAL KNEE ARTHROPLASTY, LEFT: Primary | ICD-10-CM

## 2022-10-25 PROCEDURE — G8400 PT W/DXA NO RESULTS DOC: HCPCS | Performed by: ORTHOPAEDIC SURGERY

## 2022-10-25 PROCEDURE — G8417 CALC BMI ABV UP PARAM F/U: HCPCS | Performed by: ORTHOPAEDIC SURGERY

## 2022-10-25 PROCEDURE — G8427 DOCREV CUR MEDS BY ELIG CLIN: HCPCS | Performed by: ORTHOPAEDIC SURGERY

## 2022-10-25 PROCEDURE — 1123F ACP DISCUSS/DSCN MKR DOCD: CPT | Performed by: ORTHOPAEDIC SURGERY

## 2022-10-25 PROCEDURE — 99213 OFFICE O/P EST LOW 20 MIN: CPT | Performed by: ORTHOPAEDIC SURGERY

## 2022-10-25 PROCEDURE — 4004F PT TOBACCO SCREEN RCVD TLK: CPT | Performed by: ORTHOPAEDIC SURGERY

## 2022-10-25 PROCEDURE — G8484 FLU IMMUNIZE NO ADMIN: HCPCS | Performed by: ORTHOPAEDIC SURGERY

## 2022-10-25 PROCEDURE — 1090F PRES/ABSN URINE INCON ASSESS: CPT | Performed by: ORTHOPAEDIC SURGERY

## 2022-10-25 PROCEDURE — 3017F COLORECTAL CA SCREEN DOC REV: CPT | Performed by: ORTHOPAEDIC SURGERY

## 2022-10-25 NOTE — PROGRESS NOTES
Chief Complaint   Patient presents with    Knee Pain     F/u left TKA. Patient c/o continued numbness       Sina Russell returns today for follow-up of her left knee pain. she reports this is better than when I saw her last.     Past Medical History:   Diagnosis Date    Arthritis     Diabetes mellitus ([address]     Hernia of unspecified site of abdominal cavity without mention of obstruction or gangrene     Hyperlipidemia     Hypertension     Reflux      Past Surgical History:   Procedure Laterality Date     SECTION      COLONOSCOPY      ENDOSCOPY, COLON, DIAGNOSTIC      KNEE ARTHROSCOPY Left 2021    LEFT KNEE ARTHROSCOPY, MEDIAL MENISECTOMY AND DEBRIDEMENT performed by Tanika Lopez DO at [address] [facility] Right 2022    TOTAL KNEE ARTHROPLASTY Left 2022    LEFT KNEE TOTAL ARTHROPLASTY [facility]) performed by Tanika Lopez DO at [address]       Current Outpatient Medications:     gabapentin (NEURONTIN) 100 MG capsule, Take 1 capsule by mouth 3 times daily for 30 days.  Intended supply: 30 days, Disp: 90 capsule, Rfl: 0    celecoxib (CELEBREX) 200 MG capsule, Take 1 capsule by mouth daily, Disp: 60 capsule, Rfl: 3    aspirin 325 MG EC tablet, Take 1 tablet by mouth 2 times daily, Disp: 60 tablet, Rfl: 0    JANUVIA 100 MG tablet, take 1 tablet by mouth once daily, Disp: , Rfl:     metoprolol succinate (TOPROL XL) 50 MG extended release tablet, take 1 AND 1/2 tablets by mouth once daily, Disp: , Rfl:     TRULICITY 4.5 XK/4.1JZ SOPN, inject 0.5 milliliter subcutaneously every week, Disp: , Rfl:     atorvastatin (LIPITOR) 40 MG tablet, take 1 tablet by mouth once daily, Disp: , Rfl:     FARXIGA 10 MG tablet, take 1 tablet by mouth once daily, Disp: , Rfl:     lisinopril (PRINIVIL;ZESTRIL) 40 MG tablet, am, Disp: , Rfl:     pioglitazone (ACTOS) 45 MG tablet, take 1 tablet by mouth once daily, Disp: , Rfl:   No Known Allergies  Social History     Socioeconomic History    Marital status:      Spouse name: Not on file    Number of children: Not on file    Years of education: Not on file    Highest education level: Not on file   Occupational History    Not on file   Tobacco Use    Smoking status: Light Smoker     Packs/day: 0.25     Years: 40.00     Pack years: 10.00     Types: Cigarettes    Smokeless tobacco: Never   Substance and Sexual Activity    Alcohol use: No    Drug use: No    Sexual activity: Not on file   Other Topics Concern    Not on file   Social History Narrative    Not on file     Social Determinants of Health     Financial Resource Strain: Not on file   Food Insecurity: Not on file   Transportation Needs: Not on file   Physical Activity: Not on file   Stress: Not on file   Social Connections: Not on file   Intimate Partner Violence: Not on file   Housing Stability: Not on file     No family history on file. Review of Systems:     Skin: (-) rash,(-) psoriasis,(-) eczema, (-)skin cancer. Musculoskeletal: (-) fractures,  (-) dislocations,(-) collagen vascular disease, (-) fibromyalgia, (-) multiple sclerosis, (-) muscular dystrophy, (-) RSD,(-) joint pain (-)swelling, (-) joint pain,swelling. Neurologic: (-) epilepsy, (-)seizures,(-) brain tumor,(-) TIA, (-)stroke, (-)headaches, (-)Parkinson disease,(-) memory loss, (-) LOC. Cardiovascular: (-) Chest pain, (-) swelling in legs/feet, (-) SOB, (-) cramping in legs/feet with walking. Constitutional:  The patient is alert and oriented x 3, appears to be stated age and in no distress. Temp 98 °F (36.7 °C)   Ht 5' 1.5\" (1.562 m)   Wt 170 lb (77.1 kg)   BMI 31.60 kg/m²     Skin:  Upon inspection: the skin appears warm, dry and intact. There is not a previous scar over the affected area. There is not any cellulitis, lymphedema or cutaneous lesions noted in the lower extremities. Upon palpation there is no induration noted.       Neurologic:  Gait: normal;  Motor exam of the lower extremities show ; quadriceps, hamstrings, foot dorsi and plantar flexors intact R.  5/5 and L. 5/5. Deep tendon reflexes are 2/4 at the knees and 2/4 at the ankles with strong extensor hallicus longus motor strength bilaterally. Sensory to both feet is intact to all sensory roots. Cardiovascular: The vascular exam is normal and is well perfused to distal extremities. Distal pulses DP/PT: R. 2+; L. 2+. There is cap refill noted less than two seconds in all digits. There is not edema of the bilateral lower extremities. There is not varicosities noted in the distal extremities. Lymph:  Upon palpation,  there is no lymphadenopathy noted in bilateral lower extremities. Musculoskeletal:  Gait: antalgic; examination of the nails and digits reveal no cyanosis or clubbing    Lumbar exam:  On visual inspection, there is no deformity of the spine. full range of motion, no tenderness, palpable spasm or pain on motion. Special tests: Straight Leg Raise negative, Preston testnegative. Hip exam:  Upon inspection, there is no deformity noted. Upon palpation there is not tenderness. ROM: is   full and semetrical.   Strength: Hip Flexors 5/5; Hip Abductors 5/5; Hip Adduction 5/5. Knee exam:  Left knee exam shows;  range of motion of R. Knee is 0 to 125, and L. Knee is 0 to 120. She does have  pain on motion, effusion is none, there is tenderness over the  medial region, there are not any masses, there is not ligamentous instability, there is not  deformity noted. Knee exam: right positive for moderate crepitations, some mild tenderness laxity is not noted with  stress.       R. Knee:  Lachman's negative, Anterior Drawer negative, Posterior Drawer negative  Marjorie's negative, Thallasy  negative,   PF grind test negative, Apprehension test negative, Patellar J sign  negative  L. Knee:  Lachman's negative, Anterior Drawer negative, Posterior Drawer negative  Marjorie's negative, Thallasy  negative,   PF grind test negative, Apprehension test negative,  Patellar J sign  negative    Xrays:   S/p knee arthroplasty with no signs of loosening    MRI:   N/a  Radiographic findings reviewed with patient    Impression:  Encounter Diagnosis   Name Primary? S/P total knee arthroplasty, left Yes         Plan:   Natural history and expected course discussed. Questions answered. Educational materials distributed. Rest, ice, compression, and elevation (RICE) therapy. Reduction in offending activity. Patellar compression sleeve.    Hep  Activity as tolerated  Fu in 6 months with xr

## 2023-04-20 DIAGNOSIS — Z96.652 S/P TOTAL KNEE ARTHROPLASTY, LEFT: Primary | ICD-10-CM

## 2023-04-25 ENCOUNTER — OFFICE VISIT (OUTPATIENT)
Dept: ORTHOPEDIC SURGERY | Age: 68
End: 2023-04-25

## 2023-04-25 VITALS — WEIGHT: 170 LBS | HEIGHT: 61 IN | BODY MASS INDEX: 32.1 KG/M2

## 2023-04-25 DIAGNOSIS — Z96.652 S/P TOTAL KNEE ARTHROPLASTY, LEFT: Primary | ICD-10-CM

## 2023-04-25 NOTE — PROGRESS NOTES
Chief Complaint   Patient presents with    Follow-up     F/U Lt TKA   continues to do well. Ambulating well and using stairs without difficulty. Would like to speak to you about possible surgery for Rt. Knee. Robby Francois returns today for follow-up of her bilateral knees. Left TKA DOS 22. she reports this is better than when I saw her last but still has c/o right knee pain. Past Medical History:   Diagnosis Date    Arthritis     Diabetes mellitus ([address]     Hernia of unspecified site of abdominal cavity without mention of obstruction or gangrene     Hyperlipidemia     Hypertension     Reflux      Past Surgical History:   Procedure Laterality Date     SECTION      COLONOSCOPY      ENDOSCOPY, COLON, DIAGNOSTIC      KNEE ARTHROSCOPY Left 2021    LEFT KNEE ARTHROSCOPY, MEDIAL MENISECTOMY AND DEBRIDEMENT performed by Amie Oaets DO at [address] [facility] Right 2022    TOTAL KNEE ARTHROPLASTY Left 2022    LEFT KNEE TOTAL ARTHROPLASTY ([employer] [facility]) performed by Amie Oates DO at [address]       Current Outpatient Medications:     JANUVIA 100 MG tablet, take 1 tablet by mouth once daily, Disp: , Rfl:     metoprolol succinate (TOPROL XL) 50 MG extended release tablet, take 1 AND 1/2 tablets by mouth once daily, Disp: , Rfl:     TRULICITY 4.5 YM/9.4ER SOPN, inject 0.5 milliliter subcutaneously every week, Disp: , Rfl:     atorvastatin (LIPITOR) 40 MG tablet, take 1 tablet by mouth once daily, Disp: , Rfl:     FARXIGA 10 MG tablet, take 1 tablet by mouth once daily, Disp: , Rfl:     lisinopril (PRINIVIL;ZESTRIL) 40 MG tablet, am, Disp: , Rfl:     pioglitazone (ACTOS) 45 MG tablet, take 1 tablet by mouth once daily, Disp: , Rfl:     gabapentin (NEURONTIN) 100 MG capsule, Take 1 capsule by mouth 3 times daily for 30 days.  Intended supply: 30 days, Disp: 90 capsule, Rfl: 0    celecoxib (CELEBREX) 200 MG capsule, Take 1 capsule by mouth daily, Disp:

## 2023-09-18 ENCOUNTER — OFFICE VISIT (OUTPATIENT)
Dept: ORTHOPEDIC SURGERY | Age: 68
End: 2023-09-18
Payer: MEDICARE

## 2023-09-18 VITALS — HEIGHT: 61 IN | WEIGHT: 170 LBS | BODY MASS INDEX: 32.1 KG/M2 | TEMPERATURE: 98 F

## 2023-09-18 DIAGNOSIS — M17.11 PRIMARY OSTEOARTHRITIS OF RIGHT KNEE: Primary | ICD-10-CM

## 2023-09-18 PROCEDURE — 1090F PRES/ABSN URINE INCON ASSESS: CPT | Performed by: ORTHOPAEDIC SURGERY

## 2023-09-18 PROCEDURE — 99214 OFFICE O/P EST MOD 30 MIN: CPT | Performed by: ORTHOPAEDIC SURGERY

## 2023-09-18 PROCEDURE — 1123F ACP DISCUSS/DSCN MKR DOCD: CPT | Performed by: ORTHOPAEDIC SURGERY

## 2023-09-18 PROCEDURE — 3017F COLORECTAL CA SCREEN DOC REV: CPT | Performed by: ORTHOPAEDIC SURGERY

## 2023-09-18 PROCEDURE — G8427 DOCREV CUR MEDS BY ELIG CLIN: HCPCS | Performed by: ORTHOPAEDIC SURGERY

## 2023-09-18 PROCEDURE — G8400 PT W/DXA NO RESULTS DOC: HCPCS | Performed by: ORTHOPAEDIC SURGERY

## 2023-09-18 PROCEDURE — 4004F PT TOBACCO SCREEN RCVD TLK: CPT | Performed by: ORTHOPAEDIC SURGERY

## 2023-09-18 PROCEDURE — G8417 CALC BMI ABV UP PARAM F/U: HCPCS | Performed by: ORTHOPAEDIC SURGERY

## 2023-09-18 NOTE — PROGRESS NOTES
Chief Complaint   Patient presents with    Knee Pain     Pt has consistent pain in the Right Knee. Amanda Michelle returns today for follow-up of her right knee pain. The pain is about the same as when I saw her last. She is scheduled for RTKA on 10/23/23. Past Medical History:   Diagnosis Date    Arthritis     Diabetes mellitus ([address])     Hernia of unspecified site of abdominal cavity without mention of obstruction or gangrene     Hyperlipidemia     Hypertension     Reflux      Past Surgical History:   Procedure Laterality Date     SECTION      COLONOSCOPY      ENDOSCOPY, COLON, DIAGNOSTIC      KNEE ARTHROSCOPY Left 2021    LEFT KNEE ARTHROSCOPY, MEDIAL MENISECTOMY AND DEBRIDEMENT performed by Veronique Soriano DO at [address] [place] [address] Right 2022    TOTAL KNEE ARTHROPLASTY Left 2022    LEFT KNEE TOTAL ARTHROPLASTY ([employer] [facility]) performed by Veronique Soriano DO at [facility]       Current Outpatient Medications:     JANUVIA 100 MG tablet, take 1 tablet by mouth once daily, Disp: , Rfl:     metoprolol succinate (TOPROL XL) 50 MG extended release tablet, take 1 AND 1/2 tablets by mouth once daily, Disp: , Rfl:     TRULICITY 4.5 JR/3.9GT SOPN, inject 0.5 milliliter subcutaneously every week, Disp: , Rfl:     atorvastatin (LIPITOR) 40 MG tablet, take 1 tablet by mouth once daily, Disp: , Rfl:     FARXIGA 10 MG tablet, take 1 tablet by mouth once daily, Disp: , Rfl:     lisinopril (PRINIVIL;ZESTRIL) 40 MG tablet, am, Disp: , Rfl:     pioglitazone (ACTOS) 45 MG tablet, take 1 tablet by mouth once daily, Disp: , Rfl:     gabapentin (NEURONTIN) 100 MG capsule, Take 1 capsule by mouth 3 times daily for 30 days.  Intended supply: 30 days, Disp: 90 capsule, Rfl: 0    celecoxib (CELEBREX) 200 MG capsule, Take 1 capsule by mouth daily, Disp: 60 capsule, Rfl: 3    aspirin 325 MG EC tablet, Take 1 tablet by mouth 2 times daily, Disp: 60 tablet, Rfl: 0  No Known Is This A New Presentation, Or A Follow-Up?: Skin Lesion How Severe Is Your Skin Lesion?: mild Has Your Skin Lesion Been Treated?: not been treated

## 2023-10-04 ENCOUNTER — PREP FOR PROCEDURE (OUTPATIENT)
Dept: ORTHOPEDIC SURGERY | Age: 68
End: 2023-10-04

## 2023-10-04 ENCOUNTER — TELEPHONE (OUTPATIENT)
Dept: ORTHOPEDIC SURGERY | Age: 68
End: 2023-10-04

## 2023-10-04 PROBLEM — M17.11 OSTEOARTHRITIS OF RIGHT KNEE: Status: ACTIVE | Noted: 2023-10-04

## 2023-10-04 NOTE — TELEPHONE ENCOUNTER
Prior Authorization Form:      DEMOGRAPHICS:                     Patient Name:  Suki Sanford  Patient :  1955            Insurance:  Payor: MEDICARE / Plan: MEDICARE PART A AND B / Product Type: *No Product type* /   Insurance ID Number:    Payer/Plan Subscr  Sex Relation Sub. Ins. ID Effective Group Num   1. MEDICARE - Caleb Sanford 1955 Female Self [id number] 21                                    [address] [address]   2.  [address] 1955 Female Self [id number]                                    [address]         DIAGNOSIS & PROCEDURE:                       Procedure/Operation: RIGHT KNEE TOTAL KNEE ARTHROPLASTY           CPT Code: 84923    Diagnosis:  RIGHT KNEE DJD    ICD10 Code: M17.11    Location:  [facility]    Surgeon:  LOREN COTO    SCHEDULING INFORMATION:                          Date: 10/23/23    Time: 7AM              Anesthesia:  Spinal                                                       Status:  Outpatient        Special Comments:  [facility]PHEW       Electronically signed by Richard Salas ATC on 10/4/2023 at 12:40 PM

## 2023-10-13 RX ORDER — SODIUM CHLORIDE 9 MG/ML
INJECTION, SOLUTION INTRAVENOUS CONTINUOUS
OUTPATIENT
Start: 2023-10-23

## 2023-10-16 ENCOUNTER — ANESTHESIA EVENT (OUTPATIENT)
Dept: OPERATING ROOM | Age: 68
End: 2023-10-16
Payer: MEDICARE

## 2023-10-16 ENCOUNTER — HOSPITAL ENCOUNTER (OUTPATIENT)
Dept: PREADMISSION TESTING | Age: 68
Discharge: HOME | End: 2023-10-16
Payer: MEDICARE

## 2023-10-16 VITALS
HEIGHT: 61 IN | OXYGEN SATURATION: 97 % | BODY MASS INDEX: 37 KG/M2 | HEART RATE: 85 BPM | DIASTOLIC BLOOD PRESSURE: 87 MMHG | SYSTOLIC BLOOD PRESSURE: 152 MMHG | RESPIRATION RATE: 16 BRPM | WEIGHT: 196 LBS | TEMPERATURE: 97.8 F

## 2023-10-16 DIAGNOSIS — Z01.818 PRE-OP EVALUATION: ICD-10-CM

## 2023-10-16 LAB
BILIRUB UR QL STRIP: NEGATIVE
CLARITY UR: CLEAR
COLOR UR: YELLOW
COMMENT: ABNORMAL
GLUCOSE UR STRIP-MCNC: >=1000 MG/DL
HGB UR QL STRIP.AUTO: NEGATIVE
INR PPP: 1
KETONES UR STRIP-MCNC: NEGATIVE MG/DL
LEUKOCYTE ESTERASE UR QL STRIP: NEGATIVE
NITRITE UR QL STRIP: NEGATIVE
PARTIAL THROMBOPLASTIN TIME: 31.6 SEC (ref 24.5–35.1)
PH UR STRIP: 5.5 [PH] (ref 5–9)
PREALB SERPL-MCNC: 26 MG/DL (ref 20–40)
PROT UR STRIP-MCNC: NEGATIVE MG/DL
PROTHROMBIN TIME: 11.1 SEC (ref 9.3–12.4)
SP GR UR STRIP: 1.02 (ref 1–1.03)
UROBILINOGEN UR STRIP-ACNC: 0.2 EU/DL (ref 0–1)

## 2023-10-16 PROCEDURE — 85730 THROMBOPLASTIN TIME PARTIAL: CPT

## 2023-10-16 PROCEDURE — 85610 PROTHROMBIN TIME: CPT

## 2023-10-16 PROCEDURE — 84134 ASSAY OF PREALBUMIN: CPT

## 2023-10-16 PROCEDURE — 93005 ELECTROCARDIOGRAM TRACING: CPT

## 2023-10-16 PROCEDURE — 81003 URINALYSIS AUTO W/O SCOPE: CPT

## 2023-10-16 PROCEDURE — 87081 CULTURE SCREEN ONLY: CPT

## 2023-10-16 PROCEDURE — 87086 URINE CULTURE/COLONY COUNT: CPT

## 2023-10-16 RX ORDER — ANASTROZOLE 1 MG/1
1 TABLET ORAL DAILY
COMMUNITY
Start: 2023-08-18

## 2023-10-16 RX ORDER — SCOLOPAMINE TRANSDERMAL SYSTEM 1 MG/1
1 PATCH, EXTENDED RELEASE TRANSDERMAL
OUTPATIENT
Start: 2023-10-16

## 2023-10-16 ASSESSMENT — LIFESTYLE VARIABLES: SMOKING_STATUS: 1

## 2023-10-16 ASSESSMENT — PAIN DESCRIPTION - PAIN TYPE: TYPE: CHRONIC PAIN

## 2023-10-16 ASSESSMENT — PAIN - FUNCTIONAL ASSESSMENT: PAIN_FUNCTIONAL_ASSESSMENT: PREVENTS OR INTERFERES SOME ACTIVE ACTIVITIES AND ADLS

## 2023-10-16 ASSESSMENT — PAIN DESCRIPTION - FREQUENCY: FREQUENCY: INTERMITTENT

## 2023-10-16 ASSESSMENT — PAIN DESCRIPTION - ONSET: ONSET: ON-GOING

## 2023-10-16 ASSESSMENT — PAIN DESCRIPTION - DESCRIPTORS: DESCRIPTORS: ACHING

## 2023-10-16 ASSESSMENT — PAIN DESCRIPTION - LOCATION: LOCATION: KNEE

## 2023-10-16 ASSESSMENT — PAIN SCALES - GENERAL: PAINLEVEL_OUTOF10: 8

## 2023-10-16 ASSESSMENT — PAIN DESCRIPTION - ORIENTATION: ORIENTATION: RIGHT

## 2023-10-16 NOTE — PROGRESS NOTES
Labs, EKG  faxed to Dr Evelia Fajardo for review. Requested from [facility] for remaining lab results and CXR.   Melinda De Dios RN.  10/16/2023  2:17 PM
copy.    If appropriate bring crutches, inspirex, WALKER, CANE etc... Notify your Surgeon if you develop any illness between now and surgery time, cough, cold, fever, sore throat, nausea, vomiting, etc.  Please notify your surgeon if you experience dizziness, shortness of breath or blurred vision between now & the time of your surgery. If you have ___dentures, they will be removed before going to the OR; we will provide you a container. If you wear ___contact lenses or _x__glasses, they will be removed; please bring a case for them. To provide excellent care visitors will be limited to 2 in the room at any given time. During flu season no children under the age of 15 are permitted in the hospital for the safety of all patients. Other come in main lobby and stop  at                  Please call AMBULATORY CARE if you have any further questions.    [address]

## 2023-10-17 LAB
MICROORGANISM SPEC CULT: ABNORMAL
MICROORGANISM SPEC CULT: ABNORMAL
SPECIMEN DESCRIPTION: ABNORMAL

## 2023-10-18 LAB
EKG ATRIAL RATE: 80 BPM
EKG P AXIS: 49 DEGREES
EKG P-R INTERVAL: 192 MS
EKG Q-T INTERVAL: 386 MS
EKG QRS DURATION: 82 MS
EKG QTC CALCULATION (BAZETT): 445 MS
EKG R AXIS: 18 DEGREES
EKG T AXIS: 26 DEGREES
EKG VENTRICULAR RATE: 80 BPM
MICROORGANISM SPEC CULT: NORMAL
SPECIMEN DESCRIPTION: NORMAL

## 2023-10-23 ENCOUNTER — ANESTHESIA (OUTPATIENT)
Dept: OPERATING ROOM | Age: 68
End: 2023-10-23
Payer: MEDICARE

## 2023-10-23 ENCOUNTER — HOSPITAL ENCOUNTER (OUTPATIENT)
Age: 68
Setting detail: OUTPATIENT SURGERY
Discharge: HOME | End: 2023-10-23
Attending: ORTHOPAEDIC SURGERY | Admitting: ORTHOPAEDIC SURGERY
Payer: MEDICARE

## 2023-10-23 ENCOUNTER — APPOINTMENT (OUTPATIENT)
Dept: GENERAL RADIOLOGY | Age: 68
End: 2023-10-23
Attending: ORTHOPAEDIC SURGERY
Payer: MEDICARE

## 2023-10-23 VITALS
BODY MASS INDEX: 37 KG/M2 | DIASTOLIC BLOOD PRESSURE: 63 MMHG | SYSTOLIC BLOOD PRESSURE: 131 MMHG | HEART RATE: 98 BPM | WEIGHT: 196 LBS | TEMPERATURE: 97.5 F | HEIGHT: 61 IN | RESPIRATION RATE: 20 BRPM | OXYGEN SATURATION: 95 %

## 2023-10-23 DIAGNOSIS — G89.18 POST-OP PAIN: ICD-10-CM

## 2023-10-23 DIAGNOSIS — Z01.818 PRE-OP EVALUATION: Primary | ICD-10-CM

## 2023-10-23 DIAGNOSIS — M17.11 OSTEOARTHRITIS OF RIGHT KNEE: ICD-10-CM

## 2023-10-23 LAB
GLUCOSE BLD-MCNC: 148 MG/DL (ref 74–99)
GLUCOSE BLD-MCNC: 154 MG/DL (ref 74–99)
HCT VFR BLD AUTO: 39.9 % (ref 34–48)
HGB BLD-MCNC: 12.9 G/DL (ref 11.5–15.5)

## 2023-10-23 PROCEDURE — 73560 X-RAY EXAM OF KNEE 1 OR 2: CPT

## 2023-10-23 PROCEDURE — 3600000005 HC SURGERY LEVEL 5 BASE: Performed by: ORTHOPAEDIC SURGERY

## 2023-10-23 PROCEDURE — 6360000002 HC RX W HCPCS

## 2023-10-23 PROCEDURE — 6360000002 HC RX W HCPCS: Performed by: ORTHOPAEDIC SURGERY

## 2023-10-23 PROCEDURE — 6360000002 HC RX W HCPCS: Performed by: ANESTHESIOLOGY

## 2023-10-23 PROCEDURE — 6360000002 HC RX W HCPCS: Performed by: ANESTHESIOLOGIST ASSISTANT

## 2023-10-23 PROCEDURE — 2500000003 HC RX 250 WO HCPCS: Performed by: ORTHOPAEDIC SURGERY

## 2023-10-23 PROCEDURE — 97535 SELF CARE MNGMENT TRAINING: CPT

## 2023-10-23 PROCEDURE — 7100000011 HC PHASE II RECOVERY - ADDTL 15 MIN: Performed by: ORTHOPAEDIC SURGERY

## 2023-10-23 PROCEDURE — 64447 NJX AA&/STRD FEMORAL NRV IMG: CPT | Performed by: ANESTHESIOLOGY

## 2023-10-23 PROCEDURE — 88311 DECALCIFY TISSUE: CPT

## 2023-10-23 PROCEDURE — 3600000015 HC SURGERY LEVEL 5 ADDTL 15MIN: Performed by: ORTHOPAEDIC SURGERY

## 2023-10-23 PROCEDURE — 2709999900 HC NON-CHARGEABLE SUPPLY: Performed by: ORTHOPAEDIC SURGERY

## 2023-10-23 PROCEDURE — 7100000010 HC PHASE II RECOVERY - FIRST 15 MIN: Performed by: ORTHOPAEDIC SURGERY

## 2023-10-23 PROCEDURE — 2580000003 HC RX 258: Performed by: ANESTHESIOLOGY

## 2023-10-23 PROCEDURE — 7100000001 HC PACU RECOVERY - ADDTL 15 MIN: Performed by: ORTHOPAEDIC SURGERY

## 2023-10-23 PROCEDURE — 85018 HEMOGLOBIN: CPT

## 2023-10-23 PROCEDURE — 6370000000 HC RX 637 (ALT 250 FOR IP): Performed by: ORTHOPAEDIC SURGERY

## 2023-10-23 PROCEDURE — 82962 GLUCOSE BLOOD TEST: CPT

## 2023-10-23 PROCEDURE — 85014 HEMATOCRIT: CPT

## 2023-10-23 PROCEDURE — 2580000003 HC RX 258

## 2023-10-23 PROCEDURE — C1776 JOINT DEVICE (IMPLANTABLE): HCPCS | Performed by: ORTHOPAEDIC SURGERY

## 2023-10-23 PROCEDURE — 7100000000 HC PACU RECOVERY - FIRST 15 MIN: Performed by: ORTHOPAEDIC SURGERY

## 2023-10-23 PROCEDURE — 97161 PT EVAL LOW COMPLEX 20 MIN: CPT | Performed by: PHYSICAL THERAPIST

## 2023-10-23 PROCEDURE — 3700000001 HC ADD 15 MINUTES (ANESTHESIA): Performed by: ORTHOPAEDIC SURGERY

## 2023-10-23 PROCEDURE — 97165 OT EVAL LOW COMPLEX 30 MIN: CPT

## 2023-10-23 PROCEDURE — 88305 TISSUE EXAM BY PATHOLOGIST: CPT

## 2023-10-23 PROCEDURE — 3700000000 HC ANESTHESIA ATTENDED CARE: Performed by: ORTHOPAEDIC SURGERY

## 2023-10-23 PROCEDURE — A4217 STERILE WATER/SALINE, 500 ML: HCPCS | Performed by: ORTHOPAEDIC SURGERY

## 2023-10-23 PROCEDURE — 2580000003 HC RX 258: Performed by: ORTHOPAEDIC SURGERY

## 2023-10-23 PROCEDURE — 27447 TOTAL KNEE ARTHROPLASTY: CPT | Performed by: ORTHOPAEDIC SURGERY

## 2023-10-23 PROCEDURE — C1713 ANCHOR/SCREW BN/BN,TIS/BN: HCPCS | Performed by: ORTHOPAEDIC SURGERY

## 2023-10-23 PROCEDURE — 6370000000 HC RX 637 (ALT 250 FOR IP)

## 2023-10-23 DEVICE — LEGION PS HIGH FLEX XLPE SZ 1-2 11MM
Type: IMPLANTABLE DEVICE | Site: KNEE | Status: FUNCTIONAL
Brand: LEGION

## 2023-10-23 DEVICE — KNEE K1 TOT HEMI STD CEM IMPL CAPPED K1 SN: Type: IMPLANTABLE DEVICE | Status: FUNCTIONAL

## 2023-10-23 DEVICE — FULL DOSE BONE CEMENT, 10 PACK CATALOG NUMBER IS 6191-1-010
Type: IMPLANTABLE DEVICE | Site: KNEE | Status: FUNCTIONAL
Brand: SIMPLEX

## 2023-10-23 DEVICE — GEN II 7.5MM RESUR PAT 26MM
Type: IMPLANTABLE DEVICE | Site: KNEE | Status: FUNCTIONAL
Brand: GENESIS II

## 2023-10-23 DEVICE — LEGION NARROW POSTERIOR STABILIZED                                    OXINIUM SIZE 3N RIGHT
Type: IMPLANTABLE DEVICE | Site: KNEE | Status: FUNCTIONAL
Brand: LEGION

## 2023-10-23 DEVICE — GENESIS II NON-POROUS TIBIAL                                    BASEPLATE SIZE 2 RIGHT
Type: IMPLANTABLE DEVICE | Site: KNEE | Status: FUNCTIONAL
Brand: GENESIS II

## 2023-10-23 RX ORDER — ONDANSETRON 2 MG/ML
4 INJECTION INTRAMUSCULAR; INTRAVENOUS
Status: DISCONTINUED | OUTPATIENT
Start: 2023-10-23 | End: 2023-10-23 | Stop reason: HOSPADM

## 2023-10-23 RX ORDER — VANCOMYCIN HYDROCHLORIDE 1 G/20ML
INJECTION, POWDER, LYOPHILIZED, FOR SOLUTION INTRAVENOUS PRN
Status: DISCONTINUED | OUTPATIENT
Start: 2023-10-23 | End: 2023-10-23 | Stop reason: ALTCHOICE

## 2023-10-23 RX ORDER — SODIUM CHLORIDE 9 MG/ML
INJECTION, SOLUTION INTRAVENOUS PRN
Status: DISCONTINUED | OUTPATIENT
Start: 2023-10-23 | End: 2023-10-23 | Stop reason: HOSPADM

## 2023-10-23 RX ORDER — MAGNESIUM HYDROXIDE 1200 MG/15ML
LIQUID ORAL CONTINUOUS PRN
Status: COMPLETED | OUTPATIENT
Start: 2023-10-23 | End: 2023-10-23

## 2023-10-23 RX ORDER — CEFAZOLIN 2 G/1
INJECTION, POWDER, FOR SOLUTION INTRAMUSCULAR; INTRAVENOUS
Status: DISCONTINUED
Start: 2023-10-23 | End: 2023-10-23 | Stop reason: HOSPADM

## 2023-10-23 RX ORDER — ASPIRIN 325 MG
325 TABLET, DELAYED RELEASE (ENTERIC COATED) ORAL 2 TIMES DAILY
Status: DISCONTINUED | OUTPATIENT
Start: 2023-10-24 | End: 2023-10-23 | Stop reason: HOSPADM

## 2023-10-23 RX ORDER — DIPHENHYDRAMINE HYDROCHLORIDE 50 MG/ML
12.5 INJECTION INTRAMUSCULAR; INTRAVENOUS
Status: DISCONTINUED | OUTPATIENT
Start: 2023-10-23 | End: 2023-10-23 | Stop reason: HOSPADM

## 2023-10-23 RX ORDER — ONDANSETRON 4 MG/1
4 TABLET, ORALLY DISINTEGRATING ORAL EVERY 8 HOURS PRN
Status: DISCONTINUED | OUTPATIENT
Start: 2023-10-23 | End: 2023-10-23 | Stop reason: HOSPADM

## 2023-10-23 RX ORDER — ROPIVACAINE HYDROCHLORIDE 5 MG/ML
30 INJECTION, SOLUTION EPIDURAL; INFILTRATION; PERINEURAL ONCE
Status: COMPLETED | OUTPATIENT
Start: 2023-10-23 | End: 2023-10-23

## 2023-10-23 RX ORDER — ROPIVACAINE HYDROCHLORIDE 5 MG/ML
INJECTION, SOLUTION EPIDURAL; INFILTRATION; PERINEURAL
Status: COMPLETED | OUTPATIENT
Start: 2023-10-23 | End: 2023-10-23

## 2023-10-23 RX ORDER — OXYCODONE HYDROCHLORIDE 5 MG/1
10 TABLET ORAL EVERY 4 HOURS PRN
Status: DISCONTINUED | OUTPATIENT
Start: 2023-10-23 | End: 2023-10-23 | Stop reason: HOSPADM

## 2023-10-23 RX ORDER — DEXTROSE AND SODIUM CHLORIDE 5; .45 G/100ML; G/100ML
INJECTION, SOLUTION INTRAVENOUS CONTINUOUS
Status: DISCONTINUED | OUTPATIENT
Start: 2023-10-23 | End: 2023-10-23 | Stop reason: HOSPADM

## 2023-10-23 RX ORDER — MIDAZOLAM HYDROCHLORIDE 1 MG/ML
2 INJECTION INTRAMUSCULAR; INTRAVENOUS
Status: DISCONTINUED | OUTPATIENT
Start: 2023-10-23 | End: 2023-10-23 | Stop reason: HOSPADM

## 2023-10-23 RX ORDER — FENTANYL CITRATE 50 UG/ML
INJECTION, SOLUTION INTRAMUSCULAR; INTRAVENOUS
Status: COMPLETED
Start: 2023-10-23 | End: 2023-10-23

## 2023-10-23 RX ORDER — SODIUM CHLORIDE 0.9 % (FLUSH) 0.9 %
5-40 SYRINGE (ML) INJECTION EVERY 12 HOURS SCHEDULED
Status: DISCONTINUED | OUTPATIENT
Start: 2023-10-23 | End: 2023-10-23 | Stop reason: HOSPADM

## 2023-10-23 RX ORDER — ATORVASTATIN CALCIUM 40 MG/1
40 TABLET, FILM COATED ORAL DAILY
Status: CANCELLED | OUTPATIENT
Start: 2023-10-23

## 2023-10-23 RX ORDER — ACETAMINOPHEN 500 MG
1000 TABLET ORAL ONCE
Status: COMPLETED | OUTPATIENT
Start: 2023-10-23 | End: 2023-10-23

## 2023-10-23 RX ORDER — ANASTROZOLE 1 MG/1
1 TABLET ORAL DAILY
Status: CANCELLED | OUTPATIENT
Start: 2023-10-23

## 2023-10-23 RX ORDER — PIOGLITAZONEHYDROCHLORIDE 45 MG/1
45 TABLET ORAL DAILY
Status: CANCELLED | OUTPATIENT
Start: 2023-10-23

## 2023-10-23 RX ORDER — FENTANYL CITRATE 0.05 MG/ML
25 INJECTION, SOLUTION INTRAMUSCULAR; INTRAVENOUS EVERY 5 MIN PRN
Status: DISCONTINUED | OUTPATIENT
Start: 2023-10-23 | End: 2023-10-23 | Stop reason: HOSPADM

## 2023-10-23 RX ORDER — MORPHINE SULFATE 2 MG/ML
2 INJECTION, SOLUTION INTRAMUSCULAR; INTRAVENOUS
Status: DISCONTINUED | OUTPATIENT
Start: 2023-10-23 | End: 2023-10-23 | Stop reason: HOSPADM

## 2023-10-23 RX ORDER — OXYCODONE HYDROCHLORIDE AND ACETAMINOPHEN 5; 325 MG/1; MG/1
1 TABLET ORAL EVERY 6 HOURS PRN
Qty: 28 TABLET | Refills: 0 | Status: SHIPPED | OUTPATIENT
Start: 2023-10-23 | End: 2023-10-30

## 2023-10-23 RX ORDER — MIDAZOLAM HYDROCHLORIDE 1 MG/ML
1 INJECTION INTRAMUSCULAR; INTRAVENOUS ONCE
Status: COMPLETED | OUTPATIENT
Start: 2023-10-23 | End: 2023-10-23

## 2023-10-23 RX ORDER — MIDAZOLAM HYDROCHLORIDE 1 MG/ML
INJECTION INTRAMUSCULAR; INTRAVENOUS PRN
Status: DISCONTINUED | OUTPATIENT
Start: 2023-10-23 | End: 2023-10-23 | Stop reason: SDUPTHER

## 2023-10-23 RX ORDER — FENTANYL CITRATE 50 UG/ML
INJECTION, SOLUTION INTRAMUSCULAR; INTRAVENOUS
Status: COMPLETED | OUTPATIENT
Start: 2023-10-23 | End: 2023-10-23

## 2023-10-23 RX ORDER — PROPOFOL 10 MG/ML
INJECTION, EMULSION INTRAVENOUS CONTINUOUS PRN
Status: DISCONTINUED | OUTPATIENT
Start: 2023-10-23 | End: 2023-10-23 | Stop reason: SDUPTHER

## 2023-10-23 RX ORDER — ALOGLIPTIN 25 MG/1
25 TABLET, FILM COATED ORAL DAILY
Status: CANCELLED | OUTPATIENT
Start: 2023-10-23

## 2023-10-23 RX ORDER — PROCHLORPERAZINE EDISYLATE 5 MG/ML
5 INJECTION INTRAMUSCULAR; INTRAVENOUS
Status: DISCONTINUED | OUTPATIENT
Start: 2023-10-23 | End: 2023-10-23 | Stop reason: HOSPADM

## 2023-10-23 RX ORDER — SODIUM CHLORIDE 0.9 % (FLUSH) 0.9 %
5-40 SYRINGE (ML) INJECTION PRN
Status: DISCONTINUED | OUTPATIENT
Start: 2023-10-23 | End: 2023-10-23 | Stop reason: HOSPADM

## 2023-10-23 RX ORDER — KETOROLAC TROMETHAMINE 15 MG/ML
15 INJECTION, SOLUTION INTRAMUSCULAR; INTRAVENOUS
Status: DISCONTINUED | OUTPATIENT
Start: 2023-10-23 | End: 2023-10-23 | Stop reason: HOSPADM

## 2023-10-23 RX ORDER — LABETALOL HYDROCHLORIDE 5 MG/ML
10 INJECTION, SOLUTION INTRAVENOUS
Status: DISCONTINUED | OUTPATIENT
Start: 2023-10-23 | End: 2023-10-23 | Stop reason: HOSPADM

## 2023-10-23 RX ORDER — LISINOPRIL 20 MG/1
40 TABLET ORAL DAILY
Status: CANCELLED | OUTPATIENT
Start: 2023-10-23

## 2023-10-23 RX ORDER — MEPERIDINE HYDROCHLORIDE 25 MG/ML
12.5 INJECTION INTRAMUSCULAR; INTRAVENOUS; SUBCUTANEOUS EVERY 5 MIN PRN
Status: DISCONTINUED | OUTPATIENT
Start: 2023-10-23 | End: 2023-10-23 | Stop reason: HOSPADM

## 2023-10-23 RX ORDER — HYDRALAZINE HYDROCHLORIDE 20 MG/ML
10 INJECTION INTRAMUSCULAR; INTRAVENOUS
Status: DISCONTINUED | OUTPATIENT
Start: 2023-10-23 | End: 2023-10-23 | Stop reason: HOSPADM

## 2023-10-23 RX ORDER — WATER 1000 ML/1000ML
INJECTION, SOLUTION INTRAVENOUS
Status: DISCONTINUED
Start: 2023-10-23 | End: 2023-10-23 | Stop reason: HOSPADM

## 2023-10-23 RX ORDER — IPRATROPIUM BROMIDE AND ALBUTEROL SULFATE 2.5; .5 MG/3ML; MG/3ML
1 SOLUTION RESPIRATORY (INHALATION)
Status: DISCONTINUED | OUTPATIENT
Start: 2023-10-23 | End: 2023-10-23 | Stop reason: HOSPADM

## 2023-10-23 RX ORDER — ROPIVACAINE HYDROCHLORIDE 5 MG/ML
INJECTION, SOLUTION EPIDURAL; INFILTRATION; PERINEURAL
Status: COMPLETED
Start: 2023-10-23 | End: 2023-10-23

## 2023-10-23 RX ORDER — MORPHINE SULFATE 2 MG/ML
2 INJECTION, SOLUTION INTRAMUSCULAR; INTRAVENOUS EVERY 5 MIN PRN
Status: DISCONTINUED | OUTPATIENT
Start: 2023-10-23 | End: 2023-10-23 | Stop reason: HOSPADM

## 2023-10-23 RX ORDER — SCOLOPAMINE TRANSDERMAL SYSTEM 1 MG/1
1 PATCH, EXTENDED RELEASE TRANSDERMAL
Status: DISCONTINUED | OUTPATIENT
Start: 2023-10-23 | End: 2023-10-23 | Stop reason: HOSPADM

## 2023-10-23 RX ORDER — ASPIRIN 325 MG
325 TABLET, DELAYED RELEASE (ENTERIC COATED) ORAL 2 TIMES DAILY
Qty: 56 TABLET | Refills: 0 | Status: SHIPPED | OUTPATIENT
Start: 2023-10-23 | End: 2023-11-20

## 2023-10-23 RX ORDER — MIDAZOLAM HYDROCHLORIDE 1 MG/ML
INJECTION INTRAMUSCULAR; INTRAVENOUS
Status: COMPLETED
Start: 2023-10-23 | End: 2023-10-23

## 2023-10-23 RX ORDER — MELOXICAM 7.5 MG/1
3.75 TABLET ORAL DAILY
Status: DISCONTINUED | OUTPATIENT
Start: 2023-10-23 | End: 2023-10-23 | Stop reason: HOSPADM

## 2023-10-23 RX ORDER — CELECOXIB 100 MG/1
100 CAPSULE ORAL ONCE
Status: COMPLETED | OUTPATIENT
Start: 2023-10-23 | End: 2023-10-23

## 2023-10-23 RX ORDER — ACETAMINOPHEN 325 MG/1
650 TABLET ORAL EVERY 6 HOURS
Status: DISCONTINUED | OUTPATIENT
Start: 2023-10-24 | End: 2023-10-23 | Stop reason: HOSPADM

## 2023-10-23 RX ORDER — DEXAMETHASONE SODIUM PHOSPHATE 10 MG/ML
8 INJECTION INTRAMUSCULAR; INTRAVENOUS ONCE
Status: COMPLETED | OUTPATIENT
Start: 2023-10-23 | End: 2023-10-23

## 2023-10-23 RX ORDER — CELECOXIB 100 MG/1
100 CAPSULE ORAL 2 TIMES DAILY
Qty: 60 CAPSULE | Refills: 0 | Status: SHIPPED | OUTPATIENT
Start: 2023-10-23

## 2023-10-23 RX ORDER — GABAPENTIN 100 MG/1
100 CAPSULE ORAL 3 TIMES DAILY
Qty: 90 CAPSULE | Refills: 0 | Status: SHIPPED | OUTPATIENT
Start: 2023-10-23 | End: 2023-11-22

## 2023-10-23 RX ORDER — FENTANYL CITRATE 50 UG/ML
INJECTION, SOLUTION INTRAMUSCULAR; INTRAVENOUS PRN
Status: DISCONTINUED | OUTPATIENT
Start: 2023-10-23 | End: 2023-10-23

## 2023-10-23 RX ORDER — FENTANYL CITRATE 50 UG/ML
50 INJECTION, SOLUTION INTRAMUSCULAR; INTRAVENOUS ONCE
Status: COMPLETED | OUTPATIENT
Start: 2023-10-23 | End: 2023-10-23

## 2023-10-23 RX ORDER — LIDOCAINE HYDROCHLORIDE 20 MG/ML
INJECTION, SOLUTION INTRAVENOUS PRN
Status: DISCONTINUED | OUTPATIENT
Start: 2023-10-23 | End: 2023-10-23 | Stop reason: SDUPTHER

## 2023-10-23 RX ORDER — ONDANSETRON 2 MG/ML
4 INJECTION INTRAMUSCULAR; INTRAVENOUS EVERY 6 HOURS PRN
Status: DISCONTINUED | OUTPATIENT
Start: 2023-10-23 | End: 2023-10-23 | Stop reason: HOSPADM

## 2023-10-23 RX ORDER — SODIUM CHLORIDE 9 MG/ML
INJECTION, SOLUTION INTRAVENOUS CONTINUOUS
Status: DISCONTINUED | OUTPATIENT
Start: 2023-10-23 | End: 2023-10-23 | Stop reason: HOSPADM

## 2023-10-23 RX ADMIN — ACETAMINOPHEN 1000 MG: 500 TABLET ORAL at 05:25

## 2023-10-23 RX ADMIN — PROPOFOL INJECTABLE EMULSION 100 MCG/KG/MIN: 10 INJECTION, EMULSION INTRAVENOUS at 07:21

## 2023-10-23 RX ADMIN — PHENYLEPHRINE HYDROCHLORIDE 100 MCG: 10 INJECTION INTRAVENOUS at 08:08

## 2023-10-23 RX ADMIN — FENTANYL CITRATE 25 MCG: 50 INJECTION, SOLUTION INTRAMUSCULAR; INTRAVENOUS at 07:30

## 2023-10-23 RX ADMIN — FENTANYL CITRATE 50 MCG: 50 INJECTION INTRAMUSCULAR; INTRAVENOUS at 06:44

## 2023-10-23 RX ADMIN — PHENYLEPHRINE HYDROCHLORIDE 100 MCG: 10 INJECTION INTRAVENOUS at 08:21

## 2023-10-23 RX ADMIN — WATER 2000 MG: 1 INJECTION INTRAMUSCULAR; INTRAVENOUS; SUBCUTANEOUS at 10:40

## 2023-10-23 RX ADMIN — ROPIVACAINE HYDROCHLORIDE 30 ML: 5 INJECTION EPIDURAL; INFILTRATION; PERINEURAL at 06:53

## 2023-10-23 RX ADMIN — FENTANYL CITRATE 25 MCG: 50 INJECTION, SOLUTION INTRAMUSCULAR; INTRAVENOUS at 07:09

## 2023-10-23 RX ADMIN — LIDOCAINE HYDROCHLORIDE 50 MG: 20 INJECTION, SOLUTION INTRAVENOUS at 07:21

## 2023-10-23 RX ADMIN — MELOXICAM 3.75 MG: 7.5 TABLET ORAL at 11:25

## 2023-10-23 RX ADMIN — SODIUM CHLORIDE: 900 INJECTION, SOLUTION INTRAVENOUS at 06:50

## 2023-10-23 RX ADMIN — PHENYLEPHRINE HYDROCHLORIDE 200 MCG: 10 INJECTION INTRAVENOUS at 07:49

## 2023-10-23 RX ADMIN — PHENYLEPHRINE HYDROCHLORIDE 100 MCG: 10 INJECTION INTRAVENOUS at 07:36

## 2023-10-23 RX ADMIN — ROPIVACAINE HYDROCHLORIDE 30 ML: 5 INJECTION, SOLUTION EPIDURAL; INFILTRATION; PERINEURAL at 06:51

## 2023-10-23 RX ADMIN — CELECOXIB 100 MG: 100 CAPSULE ORAL at 05:25

## 2023-10-23 RX ADMIN — DEXAMETHASONE SODIUM PHOSPHATE 8 MG: 10 INJECTION INTRAMUSCULAR; INTRAVENOUS at 05:37

## 2023-10-23 RX ADMIN — PHENYLEPHRINE HYDROCHLORIDE 100 MCG: 10 INJECTION INTRAVENOUS at 07:17

## 2023-10-23 RX ADMIN — CEFAZOLIN 2000 MG: 2 INJECTION, POWDER, FOR SOLUTION INTRAMUSCULAR; INTRAVENOUS at 07:21

## 2023-10-23 RX ADMIN — SODIUM CHLORIDE: 900 INJECTION, SOLUTION INTRAVENOUS at 07:35

## 2023-10-23 RX ADMIN — MIDAZOLAM HYDROCHLORIDE 1 MG: 1 INJECTION INTRAMUSCULAR; INTRAVENOUS at 06:44

## 2023-10-23 RX ADMIN — PHENYLEPHRINE HYDROCHLORIDE 200 MCG: 10 INJECTION INTRAVENOUS at 07:19

## 2023-10-23 RX ADMIN — PHENYLEPHRINE HYDROCHLORIDE 100 MCG: 10 INJECTION INTRAVENOUS at 07:47

## 2023-10-23 RX ADMIN — SODIUM CHLORIDE: 900 INJECTION, SOLUTION INTRAVENOUS at 05:37

## 2023-10-23 RX ADMIN — MIDAZOLAM 1 MG: 1 INJECTION INTRAMUSCULAR; INTRAVENOUS at 06:44

## 2023-10-23 RX ADMIN — MIDAZOLAM 1 MG: 1 INJECTION INTRAMUSCULAR; INTRAVENOUS at 07:47

## 2023-10-23 RX ADMIN — FENTANYL CITRATE 50 MCG: 50 INJECTION, SOLUTION INTRAMUSCULAR; INTRAVENOUS at 06:44

## 2023-10-23 RX ADMIN — MIDAZOLAM 1 MG: 1 INJECTION INTRAMUSCULAR; INTRAVENOUS at 08:25

## 2023-10-23 RX ADMIN — ROPIVACAINE HYDROCHLORIDE 30 ML: 5 INJECTION, SOLUTION EPIDURAL; INFILTRATION; PERINEURAL at 06:53

## 2023-10-23 ASSESSMENT — PAIN SCALES - GENERAL
PAINLEVEL_OUTOF10: 0
PAINLEVEL_OUTOF10: 2
PAINLEVEL_OUTOF10: 2
PAINLEVEL_OUTOF10: 0

## 2023-10-23 ASSESSMENT — PAIN DESCRIPTION - DESCRIPTORS
DESCRIPTORS: ACHING
DESCRIPTORS: ACHING;DISCOMFORT

## 2023-10-23 ASSESSMENT — PAIN DESCRIPTION - LOCATION
LOCATION: KNEE
LOCATION: KNEE

## 2023-10-23 ASSESSMENT — PAIN DESCRIPTION - ORIENTATION
ORIENTATION: RIGHT
ORIENTATION: RIGHT

## 2023-10-23 ASSESSMENT — PAIN - FUNCTIONAL ASSESSMENT: PAIN_FUNCTIONAL_ASSESSMENT: 0-10

## 2023-10-23 NOTE — DISCHARGE INSTRUCTIONS
adhesive or liquid stitches. Change the bandage every day. Wash the area daily with warm water, and pat it dry. Don't use hydrogen peroxide or alcohol. They can slow healing. You may cover the area with a gauze bandage if it oozes fluid or rubs against clothing. You may shower 24 to 48 hours after surgery. Pat the incision dry. Don't swim or take a bath for the first 2 weeks, or until your doctor tells you it is okay. Exercise    Your rehab program will give you a number of exercises to do to help you get back your knee's range of motion and strength. Always do them as your therapist tells you. Ice    For pain and swelling, put ice or a cold pack on the area for 10 to 20 minutes at a time. Put a thin cloth between the ice and your skin. If your doctor recommended cold therapy using a portable machine, follow the instructions that came with the machine. Other instructions    Wear compression stockings if your doctor told you to. These may help to prevent blood clots. Your doctor will tell you how long you need to keep wearing the compression stockings. Carry a medical alert card that says you have an artificial joint. You have metal pieces in your knee. These may set off some airport metal detectors. Follow-up care is a key part of your treatment and safety. Be sure to make and go to all appointments, and call your doctor if you are having problems. It's also a good idea to know your test results and keep a list of the medicines you take. When should you call for help? Call 911 anytime you think you may need emergency care. For example, call if:    You passed out (lost consciousness). You have severe trouble breathing. You have sudden chest pain and shortness of breath, or you cough up blood. Call your doctor now or seek immediate medical care if:    You have signs of infection, such as: Increased pain, swelling, warmth, or redness. Red streaks leading from the incision.   Pus draining

## 2023-10-23 NOTE — ANESTHESIA PROCEDURE NOTES
Spinal Block    Patient location during procedure: OR  End time: 10/23/2023 7:09 AM  Reason for block: primary anesthetic and at surgeon's request  Staffing  Performed: other anesthesia staff   Anesthesiologist: Rachel Mayorga MD  Resident/CRNA: Yanet Dow[address]  Performed by: Yanet Dow[address]  Authorized by: Rachel Mayorga MD    Spinal Block  Patient position: sitting  Prep: Betadine  Patient monitoring: cardiac monitor, continuous pulse ox, continuous capnometry, frequent blood pressure checks and oxygen  Approach: midline  Location: L2/L3  Provider prep: sterile gloves, sterile gown and mask  Local infiltration: lidocaine  Needle  Needle type: Pencan   Needle gauge: 25 G  Needle length: 3.5 in  Assessment  Sensory level: T4  Events: cerebrospinal fluid  Swirl obtained: Yes  CSF: clear  Attempts: 1  Hemodynamics: stable  Preanesthetic Checklist  Completed: patient identified, IV checked, site marked, risks and benefits discussed, surgical/procedural consents, equipment checked, pre-op evaluation, timeout performed, anesthesia consent given, oxygen available, monitors applied/VS acknowledged, fire risk safety assessment completed and verbalized and blood product R/B/A discussed and consented

## 2023-10-23 NOTE — ANESTHESIA PROCEDURE NOTES
Peripheral Block    Patient location during procedure: PACU  Reason for block: at surgeon's request  Start time: 10/23/2023 6:47 AM  End time: 10/23/2023 6:53 AM  Staffing  Performed: anesthesiologist   Anesthesiologist: Armida Del Cid MD  Performed by: Armida Del Cid MD  Authorized by: Armida Del Cid MD    Preanesthetic Checklist  Completed: patient identified, IV checked, site marked, risks and benefits discussed, surgical/procedural consents, equipment checked, pre-op evaluation, timeout performed, anesthesia consent given, oxygen available, monitors applied/VS acknowledged, fire risk safety assessment completed and verbalized and blood product R/B/A discussed and consented  Peripheral Block   Patient position: supine  Provider prep: mask and sterile gloves  Patient monitoring: cardiac monitor, continuous pulse ox, frequent blood pressure checks, IV access, oxygen and responsive to questions  Block type: Femoral  Adductor canal  Laterality: right  Injection technique: single-shot  Guidance: ultrasound guided  Local infiltration: lidocaine  Infiltration strength: 1 %  Local infiltration: lidocaine  Dose: 3 mL    Needle   Needle type: insulated echogenic nerve stimulator needle   Needle gauge: 20 G  Needle localization: ultrasound guidance  Needle length: 10 cm  Assessment   Injection assessment: negative aspiration for heme, no paresthesia on injection, local visualized surrounding nerve on ultrasound and no intravascular symptoms  Paresthesia pain: none  Slow fractionated injection: yes  Hemodynamics: stable  Real-time US image taken/store: yes  Outcomes: uncomplicated and patient tolerated procedure well    Medications Administered  ropivacaine (NAROPIN) injection 0.5% - Perineural   30 mL - 10/23/2023 6:51:00 AM

## 2023-10-23 NOTE — ANESTHESIA POSTPROCEDURE EVALUATION
Department of Anesthesiology  Postprocedure Note    Patient: Saige Lopez  MRN: [id number]  YOB: 1955  Date of evaluation: 10/23/2023      Procedure Summary     Date: 10/23/23 Room / Location: [address] [place] [address] [facility] Lizzeth Bliss    Anesthesia Start: 1386 Anesthesia Stop: 2536    Procedure: RIGHT KNEE TOTAL KNEE ARTHROPLASTY-10/23/23 [facility][1] AND NEPHEW (Right: Knee) Diagnosis:       Osteoarthritis of right knee      (Osteoarthritis of right knee [M17.11])    Surgeons: Chema Jimenez DO Responsible Provider: Evert Arreguin MD    Anesthesia Type: spinal ASA Status: 3          Anesthesia Type: No value filed.     Angel Phase I: Angel Score: 10    Angel Phase II: Angel Score: 10      Anesthesia Post Evaluation    Patient location during evaluation: PACU  Patient participation: complete - patient participated  Level of consciousness: awake  Airway patency: patent  Nausea & Vomiting: no nausea and no vomiting  Complications: no  Cardiovascular status: hemodynamically stable  Respiratory status: acceptable  Hydration status: euvolemic  Pain management: adequate

## 2023-10-23 NOTE — PROGRESS NOTES
635 to pacu for right adductor canal block by dr Radha Claudio.  Connected to all monitors and O2 applied at 3 liters nasal canula  640 dr Radha Claudio here to talk with patient  643 time out performed and premedicated per orders  648 block started by dr Radha Claudio using ultrasound  653 30 ml 0.5% ropivacaine injected to right adductor canal gina well
CLINICAL PHARMACY NOTE: MEDS TO BEDS    Total # of Prescriptions Filled: 4   The following medications were delivered to the patient:  Percocet 5/325 mg  Celebrex 100 mg   Gabapentin 100 mg   Aspirin 325 mg       Additional Documentation:
Physical Therapy    Physical Therapy Initial Evaluation/Plan of Care    Room #:  OR POOL/NONE  Patient Name: Horace Barroso  YOB: 1955  MRN: [id number]    Date of Service: 10/23/2023     Tentative placement recommendation: Home with Home Health Physical Therapy 5 days/week   Equipment recommendation: Patient has needed equipment       Evaluating Physical Therapist: Patricia Yang [place]  #[id number]     Specific Provider Orders/Date/Referring Provider :     PT eval and treat  Start:  10/23/23 0915,   End:  10/23/23 0915,   ONE TIME,   Standing Count:  1 Occurrences,   R         Leslie Vega DO     Admitting Diagnosis:   Osteoarthritis of right knee [M17.11]   Visit diagnosis:   Visit Diagnoses         Codes    Pre-op evaluation    -  Primary Z01.818    Post-op pain     G89.18            Patient Active Problem List   Diagnosis    Knee pain    Right knee DJD    Primary osteoarthritis of left knee    Acute medial meniscus tear, left, initial encounter    Osteoarthritis of right knee        ASSESSMENT of Current Deficits  Safe for discharge home with family at a Supervision  level. Patient will continue to need therapy to maximize function. All questions and concerns addressed. PHYSICAL THERAPY  PLAN OF CARE       Patient and or family understand(s) diagnosis, prognosis, and plan of care.        Prior Level of Function: Patient ambulated independently    Rehab Potential: good for baseline      Past medical history:   Past Medical History:   Diagnosis Date    Arthritis     Cancer ([address]) 2023    breast right    Diabetes mellitus ([address])     Hernia of unspecified site of abdominal cavity without mention of obstruction or gangrene     Hyperlipidemia     Hypertension     Reflux      Past Surgical History:   Procedure Laterality Date    BREAST LUMPECTOMY Right 2023     SECTION      COLONOSCOPY      ENDOSCOPY, COLON, DIAGNOSTIC      KNEE ARTHROSCOPY Left 2021    LEFT KNEE
Pt is having CXR done at [facility] rd. Dr Willis Askew to fax.   Angel Borden RN.  10/18/2023  2:50 PM
assistance or modifications required to perform tasks. May have comorbidities that affect occupational performance. Time In: 11:55 AM   Time Out: 12:35 PM    Total Treatment Time: 25      Min Units   OT Eval Low 97165  X  1    OT Eval Medium 22471      OT Eval High 81802      OT Re-Eval 87388            ADL/Self Care 86168 25 2   Therapeutic Activities 60725       Therapeutic Ex 01541       Orthotic Management 19805       Manual 70458     Neuro Re-Ed 57055       Non-Billable Time        Evaluation Time additionally includes thorough review of current medical information, gathering information on past medical history/social history and prior level of function, interpretation of standardized testing/informal observation of tasks, assessment of data and development of plan of care and goals.         Evaluating OT: Janeth Agustin OTR/L; #[id number]

## 2023-10-23 NOTE — H&P
discussed with the patient. The risks include but are not limited to: infection, injury to blood vessels and nerves, non relief of symptoms, arthrofibrosis of knee, aseptic loosening of prosthesis, intraoperative fracture, blood loss, PE/DVT, MI, dislocation of hip and knee, need for further operative intervention and death. The patient is aware of the risks and wished to proceed with a Right total knee replacement 10/23/23.

## 2023-10-23 NOTE — OP NOTE
Operative Note      Patient: Lc Apodaca  YOB: 1955  MRN: [id number]    Date of Procedure: 10/23/2023    Pre-Op Diagnosis Codes:     * Osteoarthritis of right knee [M17.11]    Post-Op Diagnosis: Same       Procedure(s):  RIGHT KNEE TOTAL KNEE ARTHROPLASTY-10/23/23 [facility] AND NEPHEW    Surgeon(s): Marianela Graham DO    Assistant:   First Assistant: Katina Beebe  Resident: Dimitri Pantoja; Farrukh Velazquez DO    Anesthesia: Spinal    Estimated Blood Loss (mL): less than 195     Complications: None    Specimens:   ID Type Source Tests Collected by Time Destination   A : Bone right knee Tissue Tissue SURGICAL PATHOLOGY [facility]  10/23/2023 0720        Implants:  Implant Name Type Inv. Item Serial No.  Lot No. LRB No. Used Action   CEMENT BNE 20ML 40GM FULL DOSE PMMA W/O ANTIBIO M VISC - [id number]  CEMENT BNE 20ML 40GM FULL DOSE PMMA W/O ANTIBIO M VISC  [employer] [facility]-WD [id number] Right 1 Implanted   INSERT TIB SZ 1-2 [id number] KNEE XLPE POST STBL HI FLX LEGION - [id number]  INSERT TIB SZ 1-2 [id number] KNEE XLPE POST STBL HI FLX LEGION  [employer]- [id number] Right 1 Implanted   COMPONENT FEM SZ 3 CLAUDIO RT KNEE OXINIUM POST STBL TRINO LEGION - [id number]  COMPONENT FEM SZ 3 CLAUDIO RT KNEE OXINIUM POST STBL TRINO LEGION  [employer]- [id number] Right 1 Implanted   BASEPLATE TIB SZ 2 [id number] ML64MM THK2. 3MM R KNEE TI ALLY NP - [id number]  BASEPLATE TIB SZ 2 [id number] ML64MM THK2. 3MM R KNEE TI ALLY NP  [facility] AND NEPHEW [employer] [id number] Right 1 Implanted   COMPONENT PAT CSA48ST THK7.5MM STD TRINO RESURFACE RND NP - [id number]  COMPONENT PAT [id number] THK7.5MM STD TRINO RESURFACE RND NP  [facility] AND NEPHEW [employer] [id number] Right 1 Implanted         Drains: * No LDAs found *    Findings: as above        Detailed Description of Procedure:   below    Department of Orthopedic Surgery  Operative Report        Pre-operative Diagnosis:  Right

## 2023-10-23 NOTE — CARE COORDINATION
10/23/2023:  SS NOTE:  SS Consult for post-op d/c planning and HHC order noted, pt had surgery today for an elective total knee, per PAT/nursing report, pt requested to have [facility] again for home PT, pt has agency in 2022 after her other knee replacement, pt has dme needed, notified [facility] for [facility] of home PT order, will accept referral & contact pt to arrange home therapy visit for tomorrow, [facility]/Nursing notified. Electronically signed by Deneice Riedel, LSW on 10/23/2023 at 11:09 AM

## 2023-10-26 LAB — SURGICAL PATHOLOGY REPORT: NORMAL

## 2023-11-01 DIAGNOSIS — Z96.651 STATUS POST TOTAL RIGHT KNEE REPLACEMENT: Primary | ICD-10-CM

## 2023-11-06 ENCOUNTER — OFFICE VISIT (OUTPATIENT)
Dept: ORTHOPEDIC SURGERY | Age: 68
End: 2023-11-06

## 2023-11-06 VITALS — WEIGHT: 196 LBS | HEIGHT: 61 IN | BODY MASS INDEX: 37 KG/M2 | TEMPERATURE: 98 F

## 2023-11-06 DIAGNOSIS — G89.18 POST-OP PAIN: ICD-10-CM

## 2023-11-06 DIAGNOSIS — Z96.651 S/P TOTAL KNEE ARTHROPLASTY, RIGHT: Primary | ICD-10-CM

## 2023-11-06 PROCEDURE — 99024 POSTOP FOLLOW-UP VISIT: CPT | Performed by: ORTHOPAEDIC SURGERY

## 2023-11-06 RX ORDER — OXYCODONE HYDROCHLORIDE AND ACETAMINOPHEN 5; 325 MG/1; MG/1
1 TABLET ORAL EVERY 6 HOURS PRN
Qty: 28 TABLET | Refills: 0 | Status: SHIPPED | OUTPATIENT
Start: 2023-11-06 | End: 2023-11-13

## 2023-11-06 NOTE — PROGRESS NOTES
Subjective:     Post-Operative week: 2 Status Post right Total Knee Arthroplasty, surgery date 10/23/23. Systemic or Specific Complaints:none. She is ambulating without aid. Objective:     General: alert, appears stated age and cooperative   Wound: Wound clean and dry no evidence of infection. , No Erythema, No Edema and No Drainage   Motion: Flexion: 5 to 100 Degrees   DVT Exam: No evidence of DVT seen on physical exam.  Negative Niyah's sign. No cords or calf tenderness. No significant calf/ankle edema. Imaging:  Xrays:  No signs of fracture, there is good alignment, and no signs of aseptic loosening. Radiographic findings reviewed with patient    Assessment:     Encounter Diagnosis   Name Primary? S/P total knee arthroplasty, right Yes      Doing well postoperatively. Plan:     Continues current post-op course, staples were removed at today's appointment  Patient is to continue taking enteric coated aspirin 81 mg, 1 tablet twice daily. Patient is to continue wearing JENNIFER hose, on during the day and off at night. Outpatient physical therapy is to begin immediately. No bathing/swimming/hot tub for two weeks or until incision is completely closed. We will see the patient back in 4 weeks for repeat xray and evaluation. Please call office with any questions or concerns at [phone]   Percocet 5mg    I was present and performed the entire exam and or procedure.   I agree with the documentation that was recorded by my Physician Assistant Richard Kingston PA-C.

## 2023-11-09 DIAGNOSIS — Z96.651 S/P TOTAL KNEE ARTHROPLASTY, RIGHT: Primary | ICD-10-CM

## 2023-12-06 ENCOUNTER — OFFICE VISIT (OUTPATIENT)
Dept: ORTHOPEDIC SURGERY | Age: 68
End: 2023-12-06

## 2023-12-06 VITALS — TEMPERATURE: 98 F | HEIGHT: 61 IN | BODY MASS INDEX: 36.82 KG/M2 | WEIGHT: 195 LBS

## 2023-12-06 DIAGNOSIS — Z96.651 S/P TOTAL KNEE ARTHROPLASTY, RIGHT: Primary | ICD-10-CM

## 2023-12-06 PROCEDURE — 99024 POSTOP FOLLOW-UP VISIT: CPT

## 2023-12-06 NOTE — PROGRESS NOTES
Post-Operative week: 6 Status Post right Total Knee Arthroplasty, DOS: 10/23/23  Systemic or Specific Complaints:No Complaints. She is ambulating without aid. She is going to Pt. Patient c/o stiffness throughout the day and burning pain. Objective:     General: alert, appears stated age and cooperative   Wound: Wound clean and dry no evidence of infection. , No Erythema, No Edema and No Drainage   Motion: Flexion: 0 to 115 Degrees   DVT Exam: No evidence of DVT seen on physical exam.  Negative Niyah's sign. No cords or calf tenderness. No significant calf/ankle edema. Xrays:  No signs of fracture, there is good alignment, and no signs of aseptic loosening. Assessment:     Encounter Diagnosis   Name Primary? S/P total knee arthroplasty, right Yes      Doing well postoperatively. Plan:     Continues current post-op course  Patient is to discontinue taking enteric coated aspirin 325mg. Patient is to discontinue wearing JENNIFER hose. Continue with outpatient physical therapy. Follow up 2 months.

## 2024-02-01 DIAGNOSIS — Z96.651 S/P TOTAL KNEE ARTHROPLASTY, RIGHT: Primary | ICD-10-CM

## 2024-02-07 ENCOUNTER — OFFICE VISIT (OUTPATIENT)
Dept: ORTHOPEDIC SURGERY | Age: 69
End: 2024-02-07

## 2024-02-07 VITALS — BODY MASS INDEX: 36.82 KG/M2 | TEMPERATURE: 98 F | WEIGHT: 195 LBS | HEIGHT: 61 IN

## 2024-02-07 DIAGNOSIS — Z96.651 S/P TOTAL KNEE ARTHROPLASTY, RIGHT: Primary | ICD-10-CM

## 2024-02-07 NOTE — PROGRESS NOTES
and oriented x 3, appears to be stated age and in no distress.   Temp 98 °F (36.7 °C)   Ht 1.549 m (5' 1\")   Wt 88.5 kg (195 lb)   BMI 36.84 kg/m²     Skin:  Upon inspection: the skin appears warm, dry and intact.  There is a previous scar over the affected area.There is not any cellulitis, lymphedema or cutaneous lesions noted in the lower extremities.   Upon palpation there is no induration noted.      Neurologic:  Gait: antalgic;  Motor exam of the lower extremities show ; quadriceps, hamstrings, foot dorsi and plantar flexors intact R.  5/5 and L. 5/5. Deep tendon reflexes are 2/4 at the knees and 2/4 at the ankles with strong extensor hallicus longus motor strength bilaterally. Sensory to both feet is intact to all sensory roots.    Cardiovascular:  The vascular exam is normal and is well perfused to distal extremities.  Distal pulses DP/PT: R. 2+; L. 2+.  There is cap refill noted less than two seconds in all digits. There is not edema of the bilateral lower extremities.  There is not varicosities noted in the distal extremities.      Lymph:  Upon palpation,  there is no lymphadenopathy noted in bilateral lower extremities.      Musculoskeletal:    Lumbar exam:  On visual inspection, there is not deformity of the spine.   full range of motion, no tenderness, palpable spasm or pain on motion. Special tests: Straight Leg Raise negative, Preston testnegative.    Hip exam:  Upon inspection, there is not deformity noted.  Upon palpation there is not tenderness.  ROM: is   full and symmetrical.   Strength: Hip Flexors 5/5; Hip Abductors 5/5; Hip Adduction 5/5.     Knee exam:  Right knee exam shows;  range of motion of R. Knee is 5 to 120, and L. Knee is 0 to 120. The patient does not have  pain on motion, effusion is mild, there is tenderness over the  medial region, there are not any masses, there is not ligamentous instability, there is not  deformity noted.  Knee exam: bilateral positive for moderate 
Esvin Condon(Attending)

## 2024-05-01 DIAGNOSIS — Z96.651 S/P TOTAL KNEE ARTHROPLASTY, RIGHT: Primary | ICD-10-CM

## 2024-05-07 ENCOUNTER — OFFICE VISIT (OUTPATIENT)
Dept: ORTHOPEDIC SURGERY | Age: 69
End: 2024-05-07
Payer: MEDICARE

## 2024-05-07 VITALS — HEIGHT: 61 IN | BODY MASS INDEX: 36.82 KG/M2 | WEIGHT: 195 LBS | TEMPERATURE: 98 F

## 2024-05-07 DIAGNOSIS — Z96.651 S/P TOTAL KNEE ARTHROPLASTY, RIGHT: Primary | ICD-10-CM

## 2024-05-07 PROCEDURE — 1090F PRES/ABSN URINE INCON ASSESS: CPT | Performed by: ORTHOPAEDIC SURGERY

## 2024-05-07 PROCEDURE — 1123F ACP DISCUSS/DSCN MKR DOCD: CPT | Performed by: ORTHOPAEDIC SURGERY

## 2024-05-07 PROCEDURE — 99213 OFFICE O/P EST LOW 20 MIN: CPT | Performed by: ORTHOPAEDIC SURGERY

## 2024-05-07 PROCEDURE — G8400 PT W/DXA NO RESULTS DOC: HCPCS | Performed by: ORTHOPAEDIC SURGERY

## 2024-05-07 PROCEDURE — G8427 DOCREV CUR MEDS BY ELIG CLIN: HCPCS | Performed by: ORTHOPAEDIC SURGERY

## 2024-05-07 PROCEDURE — 3017F COLORECTAL CA SCREEN DOC REV: CPT | Performed by: ORTHOPAEDIC SURGERY

## 2024-05-07 PROCEDURE — 4004F PT TOBACCO SCREEN RCVD TLK: CPT | Performed by: ORTHOPAEDIC SURGERY

## 2024-05-07 PROCEDURE — G8417 CALC BMI ABV UP PARAM F/U: HCPCS | Performed by: ORTHOPAEDIC SURGERY

## 2024-10-22 DIAGNOSIS — Z96.651 S/P TOTAL KNEE ARTHROPLASTY, RIGHT: Primary | ICD-10-CM

## 2024-10-28 ENCOUNTER — OFFICE VISIT (OUTPATIENT)
Dept: ORTHOPEDIC SURGERY | Age: 69
End: 2024-10-28

## 2024-10-28 VITALS — HEIGHT: 61 IN | BODY MASS INDEX: 36.82 KG/M2 | WEIGHT: 195 LBS | TEMPERATURE: 98 F

## 2024-10-28 DIAGNOSIS — Z96.651 S/P TOTAL KNEE ARTHROPLASTY, RIGHT: Primary | ICD-10-CM

## 2024-10-28 NOTE — PROGRESS NOTES
36.86 kg/m²     Skin:  Upon inspection: the skin appears warm, dry and intact.  There is a previous scar over the affected area.There is not any cellulitis, lymphedema or cutaneous lesions noted in the lower extremities.   Upon palpation there is no induration noted.      Neurologic:  Gait: antalgic;  Motor exam of the lower extremities show ; quadriceps, hamstrings, foot dorsi and plantar flexors intact R.  5/5 and L. 5/5. Deep tendon reflexes are 2/4 at the knees and 2/4 at the ankles with strong extensor hallicus longus motor strength bilaterally. Sensory to both feet is intact to all sensory roots.    Cardiovascular:  The vascular exam is normal and is well perfused to distal extremities.  Distal pulses DP/PT: R. 2+; L. 2+.  There is cap refill noted less than two seconds in all digits. There is not edema of the bilateral lower extremities.  There is not varicosities noted in the distal extremities.      Lymph:  Upon palpation,  there is no lymphadenopathy noted in bilateral lower extremities.      Musculoskeletal:    Lumbar exam:  On visual inspection, there is not deformity of the spine.   full range of motion, no tenderness, palpable spasm or pain on motion. Special tests: Straight Leg Raise negative, Preston testnegative.    Hip exam:  Upon inspection, there is not deformity noted.  Upon palpation there is not tenderness.  ROM: is   full and symmetrical.   Strength: Hip Flexors 5/5; Hip Abductors 5/5; Hip Adduction 5/5.     Knee exam:  Right knee exam shows;  range of motion of R. Knee is 5 to 120, and L. Knee is 0 to 120. The patient does not have  pain on motion, effusion is mild, there is tenderness over the  medial region, there are not any masses, there is not ligamentous instability, there is not  deformity noted.  Knee exam: bilateral positive for moderate crepitations, some mild tenderness laxity is not noted with stress.      R. Knee:  Lachman's negative, Anterior Drawer negative, Posterior Drawer

## 2025-05-20 LAB
ALBUMIN SERPL-MCNC: 3.9 G/DL (ref 3.5–5.2)
ALP SERPL-CCNC: 86 U/L (ref 35–104)
ALT SERPL-CCNC: 11 U/L (ref 0–35)
ANION GAP SERPL CALCULATED.3IONS-SCNC: 15 MMOL/L (ref 7–16)
AST SERPL-CCNC: 25 U/L (ref 0–35)
BILIRUB SERPL-MCNC: 0.3 MG/DL (ref 0–1.2)
BUN SERPL-MCNC: 31 MG/DL (ref 8–23)
CALCIUM SERPL-MCNC: 9.2 MG/DL (ref 8.8–10.2)
CHLORIDE SERPL-SCNC: 105 MMOL/L (ref 98–107)
CHOLEST SERPL-MCNC: 147 MG/DL
CO2 SERPL-SCNC: 23 MMOL/L (ref 22–29)
CREAT SERPL-MCNC: 1 MG/DL (ref 0.5–1)
GFR, ESTIMATED: 62 ML/MIN/1.73M2
GLUCOSE SERPL-MCNC: 120 MG/DL (ref 74–99)
HBA1C MFR BLD: 6.2 % (ref 4–5.6)
HDLC SERPL-MCNC: 60 MG/DL
LDLC SERPL CALC-MCNC: 67 MG/DL
POTASSIUM SERPL-SCNC: 5.6 MMOL/L (ref 3.5–5.1)
PROT SERPL-MCNC: 6.5 G/DL (ref 6.4–8.3)
SODIUM SERPL-SCNC: 143 MMOL/L (ref 136–145)
TRIGL SERPL-MCNC: 101 MG/DL
VLDLC SERPL CALC-MCNC: 20 MG/DL

## 2025-08-19 LAB
ALBUMIN SERPL-MCNC: 3.8 G/DL (ref 3.5–5.2)
ALP SERPL-CCNC: 82 U/L (ref 35–104)
ALT SERPL-CCNC: 12 U/L (ref 0–35)
ANION GAP SERPL CALCULATED.3IONS-SCNC: 11 MMOL/L (ref 7–16)
AST SERPL-CCNC: 20 U/L (ref 0–35)
BILIRUB SERPL-MCNC: 0.4 MG/DL (ref 0–1.2)
BUN SERPL-MCNC: 33 MG/DL (ref 8–23)
CALCIUM SERPL-MCNC: 9.1 MG/DL (ref 8.8–10.2)
CHLORIDE SERPL-SCNC: 107 MMOL/L (ref 98–107)
CHOLEST SERPL-MCNC: 159 MG/DL
CO2 SERPL-SCNC: 23 MMOL/L (ref 22–29)
CREAT SERPL-MCNC: 1 MG/DL (ref 0.5–1)
GFR, ESTIMATED: 59 ML/MIN/1.73M2
GLUCOSE SERPL-MCNC: 122 MG/DL (ref 74–99)
HBA1C MFR BLD: 6.6 % (ref 4–5.6)
HDLC SERPL-MCNC: 62 MG/DL
LDLC SERPL CALC-MCNC: 74 MG/DL
POTASSIUM SERPL-SCNC: 5.3 MMOL/L (ref 3.5–5.1)
PROT SERPL-MCNC: 6.3 G/DL (ref 6.4–8.3)
SODIUM SERPL-SCNC: 141 MMOL/L (ref 136–145)
TRIGL SERPL-MCNC: 116 MG/DL
VLDLC SERPL CALC-MCNC: 23 MG/DL

## (undated) DEVICE — SOLUTION IV 1000ML 0.9% SOD CHL PH 5 INJ USP VIAFLX PLAS

## (undated) DEVICE — 2108 SERIES SAGITTAL BLADE (28.9 X 0.64 X 58.7MM)

## (undated) DEVICE — NEPTUNE E-SEP SMOKE EVACUATION PENCIL, COATED, 70MM BLADE, PUSH BUTTON SWITCH: Brand: NEPTUNE E-SEP

## (undated) DEVICE — 3M™ IOBAN™ 2 ANTIMICROBIAL INCISE DRAPE 6650EZ: Brand: IOBAN™ 2

## (undated) DEVICE — CHLORAPREP 26ML ORANGE

## (undated) DEVICE — DRESSING HYDROFIBER AQUACEL AG ADVANTAGE 3.5X12 IN

## (undated) DEVICE — COVER,TABLE,60X90,STERILE: Brand: MEDLINE

## (undated) DEVICE — DOUBLE BASIN SET: Brand: MEDLINE INDUSTRIES, INC.

## (undated) DEVICE — HANDPIECE SET WITH BONE CLEANING TIP: Brand: INTERPULSE

## (undated) DEVICE — MEDI-VAC NON-CONDUCTIVE SUCTION TUBING: Brand: CARDINAL HEALTH

## (undated) DEVICE — SET MAJOR INSTR ORTHO

## (undated) DEVICE — BANDAGE COMPR W6INXL5YD SELF ADH COHESIVE CO FLX

## (undated) DEVICE — 3M™ COBAN™ SELF-ADHERENT WRAP, 1586S, STERILE, 6 IN X 5 YD (15 CM X 4,5 M), 12 ROLLS/CASE: Brand: 3M™ COBAN™

## (undated) DEVICE — CLOSURE SKIN FLX NONINVASIVE PRELOC TECHNOLOGY FOR 24IN

## (undated) DEVICE — YANKAUER,OPEN TIP,W/O VENT,STERILE: Brand: MEDLINE INDUSTRIES, INC.

## (undated) DEVICE — Z DISCONTINUED PER MEDLINE USE 2741944 DRESSING AQUACEL 12 IN SURG W9XL30CM SIL CVR WTRPRF VIR BACT BARR ANTIMIC

## (undated) DEVICE — NEEDLE FLTR 18GA L1.5IN MEM THK5UM BLNT DISP

## (undated) DEVICE — BANDAGE COMPR M W6INXL10YD WHT BGE VELC E MTRX HK AND LOOP

## (undated) DEVICE — BLADE,STAINLESS-STEEL,10,STRL,DISPOSABLE: Brand: MEDLINE

## (undated) DEVICE — BASIC DOUBLE BASIN 2-LF: Brand: MEDLINE INDUSTRIES, INC.

## (undated) DEVICE — GARMENT,MEDLINE,DVT,INT,CALF,MED, GEN2: Brand: MEDLINE

## (undated) DEVICE — SOLUTION IRRIG 500ML 0.9% SOD CHLO USP POUR PLAS BTL

## (undated) DEVICE — PEN: MARKING STD 100/CS: Brand: MEDICAL ACTION INDUSTRIES

## (undated) DEVICE — PEEL-AWAY HOOD: Brand: FLYTE, SURGICOOL

## (undated) DEVICE — 3M™ IOBAN™ 2 ANTIMICROBIAL INCISE DRAPE 6640EZ: Brand: IOBAN™ 2

## (undated) DEVICE — SUTURE PROL SZ 3-0 L18IN NONABSORBABLE BLU L19MM PS-2 3/8 8687H

## (undated) DEVICE — TOWEL,OR,DSP,ST,BLUE,STD,6/PK,12PK/CS: Brand: MEDLINE

## (undated) DEVICE — MARKER,SKIN,WI/RULER AND LABELS: Brand: MEDLINE

## (undated) DEVICE — CANNULA IV 18GA L15IN BLNT FILL LUERLOCK HUB MJCT

## (undated) DEVICE — 3 BONE CEMENT MIXER: Brand: MIXEVAC

## (undated) DEVICE — SOLUTION SCRB 4OZ 7.5% POVIDONE IOD ANTIMIC BTL

## (undated) DEVICE — PACK PROC ORTH LO EXT IX CUST

## (undated) DEVICE — STANDARD HYPODERMIC NEEDLE,POLYPROPYLENE HUB: Brand: MONOJECT

## (undated) DEVICE — SOLUTION IV IRRIG POUR BRL 0.9% SODIUM CHL 2F7124

## (undated) DEVICE — SYRINGE MED 50ML LUERLOCK TIP

## (undated) DEVICE — CUFF TOURNIQUET 34 SNG BLADDER DUAL PORT

## (undated) DEVICE — NEEDLE SPNL L3.5IN PNK HUB S STL REG WALL FIT STYL W/ QNCKE

## (undated) DEVICE — DRESSING GZ XRFRM 4X4(25/BX 6BX/CS)

## (undated) DEVICE — SYRINGE IRRIG 60ML SFT PLIABLE BLB EZ TO GRP 1 HND USE W/

## (undated) DEVICE — GOWN,SIRUS,POLYRNF,BRTHSLV,XLN/XL,20/CS: Brand: MEDLINE

## (undated) DEVICE — PADDING CAST W4INXL4YD NONSTERILE COT COHESIVE HND TEARABLE

## (undated) DEVICE — GLOVE ORTHO 8   MSG9480

## (undated) DEVICE — INTENDED FOR TISSUE SEPARATION, AND OTHER PROCEDURES THAT REQUIRE A SHARP SURGICAL BLADE TO PUNCTURE OR CUT.: Brand: BARD-PARKER ® STAINLESS STEEL BLADES

## (undated) DEVICE — SUPER TURBOVAC 90 INTEGRATED CABLE WAND ICW: Brand: COBLATION

## (undated) DEVICE — SOLUTION SCRB 32OZ 7.5% POVIDONE IOD BTL GENTLE EFFECTIVE

## (undated) DEVICE — STAPLER SKIN LEG L3.9MM DIA0.53MM WIDE ROT HD FOR WND CLSR

## (undated) DEVICE — ELECTRODE PT RET AD L9FT HI MOIST COND ADH HYDRGEL CORDED

## (undated) DEVICE — BANDAGE COMPR W6XL12FT SGL LAYERED NO CLSR EXSANGUATION

## (undated) DEVICE — BANDAGE COMPR W6INXL12FT SMOOTH FOR LIMB EXSANG ESMARCH

## (undated) DEVICE — SMARTGOWN BREATHABLE SPECIALTY GOWN: Brand: CONVERTORS

## (undated) DEVICE — COUNTER NDL W1.5XL2.5IN 10 COUNT

## (undated) DEVICE — 12 ML SYRINGE,LUER-LOCK TIP: Brand: MONOJECT

## (undated) DEVICE — T4 HOOD

## (undated) DEVICE — SUTURE SUTTAPE L40IN DIA1.3MM NONABSORBABLE WHT BLU L26.5MM AR7500

## (undated) DEVICE — SOLUTION IRRIG 1000ML 0.9% SOD CHL USP POUR PLAS BTL

## (undated) DEVICE — TUBING, SUCTION, 1/4" X 10', STRAIGHT: Brand: MEDLINE

## (undated) DEVICE — GOWN,SIRUS,FABRNF,XL,20/CS: Brand: MEDLINE

## (undated) DEVICE — 3M™ STERI-DRAPE™ U-DRAPE 1015: Brand: STERI-DRAPE™

## (undated) DEVICE — NDL CNTR 40CT FM MAG: Brand: MEDLINE INDUSTRIES, INC.

## (undated) DEVICE — GOWN,SIRUS,POLYRNF,RAGLAN,XL,ST,30/CS: Brand: MEDLINE

## (undated) DEVICE — 4-PORT MANIFOLD: Brand: NEPTUNE 2

## (undated) DEVICE — NEEDLE HYPO 18GA L1.5IN PNK POLYPR HUB S STL THN WALL FILL

## (undated) DEVICE — Z DISCONTINUED USE 2275686 GLOVE SURG SZ 8 L12IN FNGR THK13MIL WHT ISOLEX POLYISOPRENE

## (undated) DEVICE — STERILE VELCLOSE ELASTIC BANDAGE, 4IN X 10 YARDS: Brand: VELCLOSE

## (undated) DEVICE — BLADE SHV L13CM DIA4MM TAPR TIP SCIS LIKE CUT OVL OUTER

## (undated) DEVICE — MEDI-VAC YANKAUER SUCTION HANDLE: Brand: CARDINAL HEALTH

## (undated) DEVICE — PITCHER PT 1200ML W HNDL CSR WRP

## (undated) DEVICE — APPLICATOR MEDICATED 26 CC SOLUTION HI LT ORNG CHLORAPREP

## (undated) DEVICE — STRYKER PERFORMANCE SERIES SAGITTAL BLADE: Brand: STRYKER PERFORMANCE SERIES

## (undated) DEVICE — WIPES SKIN CLOTH READYPREP 9 X 10.5 IN 2% CHLORHEX GLUCONATE CHG PREOP

## (undated) DEVICE — TUBING PMP L16FT MAIN DISP FOR AR-6400 AR-6475

## (undated) DEVICE — SHEET,DRAPE,70X100,STERILE: Brand: MEDLINE

## (undated) DEVICE — SHEET SUPPORT

## (undated) DEVICE — SPONGE LAP W18XL18IN WHT COT 4 PLY FLD STRUNG RADPQ DISP ST 2 PER PACK

## (undated) DEVICE — GAUZE,SPONGE,4"X4",16PLY,STRL,LF,10/TRAY: Brand: MEDLINE

## (undated) DEVICE — GAUZE,SPONGE,4"X4",16PLY,XRAY,STRL,LF: Brand: MEDLINE

## (undated) DEVICE — PACK,EXTREMITY,ORTHOMAX,5/CS: Brand: MEDLINE

## (undated) DEVICE — Z DISCONTINUED USE 2272124 DRAPE SURG XL N INVASIVE 2 LAYR DISP

## (undated) DEVICE — SPONGE LAP W18XL18IN WHT COT 4 PLY FLD STRUNG RADPQ DISP ST

## (undated) DEVICE — GLOVE ORANGE PI 8   MSG9080

## (undated) DEVICE — PADDING CAST W6INXL4YD COT LO LINTING WYTEX

## (undated) DEVICE — NEEDLE SYR 18GA L1.5IN RED PLAS HUB S STL BLNT FILL W/O

## (undated) DEVICE — COVER,LIGHT HANDLE,FLX,1/PK: Brand: MEDLINE INDUSTRIES, INC.

## (undated) DEVICE — PAD,ABDOMINAL,8"X10",ST,LF: Brand: MEDLINE

## (undated) DEVICE — SOLUTION IV IRRIG 500ML 0.9% SODIUM CHL 2F7123

## (undated) DEVICE — BLADE CLIPPER GEN PURP NS

## (undated) DEVICE — Z CONVERTED USE 2275207 CLOTH PREP W7.5XL7.5IN 2% CHG SKIN ALC AND RNS FREE

## (undated) DEVICE — BANDAGE COMPR L W4INXL11YD 100% COT WVN E DBL LEN CLP CLSR